# Patient Record
Sex: MALE | Race: WHITE | NOT HISPANIC OR LATINO
[De-identification: names, ages, dates, MRNs, and addresses within clinical notes are randomized per-mention and may not be internally consistent; named-entity substitution may affect disease eponyms.]

---

## 2017-03-07 ENCOUNTER — MEDICATION RENEWAL (OUTPATIENT)
Age: 72
End: 2017-03-07

## 2017-03-21 ENCOUNTER — APPOINTMENT (OUTPATIENT)
Dept: NEPHROLOGY | Facility: CLINIC | Age: 72
End: 2017-03-21

## 2017-03-21 VITALS
OXYGEN SATURATION: 99 % | HEART RATE: 62 BPM | DIASTOLIC BLOOD PRESSURE: 82 MMHG | SYSTOLIC BLOOD PRESSURE: 157 MMHG | WEIGHT: 208 LBS | HEIGHT: 70 IN | BODY MASS INDEX: 29.78 KG/M2

## 2017-03-21 VITALS — SYSTOLIC BLOOD PRESSURE: 150 MMHG | HEART RATE: 60 BPM | DIASTOLIC BLOOD PRESSURE: 80 MMHG

## 2017-03-21 DIAGNOSIS — M10.9 GOUT, UNSPECIFIED: ICD-10-CM

## 2017-03-22 ENCOUNTER — MEDICATION RENEWAL (OUTPATIENT)
Age: 72
End: 2017-03-22

## 2017-03-22 LAB
25(OH)D3 SERPL-MCNC: 31.7 NG/ML
ALBUMIN SERPL ELPH-MCNC: 4.3 G/DL
ALP BLD-CCNC: 86 U/L
ALT SERPL-CCNC: 13 U/L
ANION GAP SERPL CALC-SCNC: 15 MMOL/L
AST SERPL-CCNC: 14 U/L
BASOPHILS # BLD AUTO: 0.06 K/UL
BASOPHILS NFR BLD AUTO: 0.8 %
BILIRUB SERPL-MCNC: 0.3 MG/DL
BUN SERPL-MCNC: 37 MG/DL
CALCIUM SERPL-MCNC: 9.3 MG/DL
CALCIUM SERPL-MCNC: 9.3 MG/DL
CHLORIDE SERPL-SCNC: 105 MMOL/L
CHOLEST SERPL-MCNC: 147 MG/DL
CHOLEST/HDLC SERPL: 3.9 RATIO
CO2 SERPL-SCNC: 21 MMOL/L
CREAT SERPL-MCNC: 2.26 MG/DL
CREAT SPEC-SCNC: 215 MG/DL
CREAT/PROT UR: 0.7 RATIO
EOSINOPHIL # BLD AUTO: 0.38 K/UL
EOSINOPHIL NFR BLD AUTO: 5.2 %
FERRITIN SERPL-MCNC: 169.2 NG/ML
GLUCOSE SERPL-MCNC: 112 MG/DL
HBA1C MFR BLD HPLC: 6.2 %
HCT VFR BLD CALC: 38.1 %
HDLC SERPL-MCNC: 38 MG/DL
HGB BLD-MCNC: 12 G/DL
IMM GRANULOCYTES NFR BLD AUTO: 0.3 %
IRON SATN MFR SERPL: 18 %
IRON SERPL-MCNC: 44 UG/DL
LDLC SERPL CALC-MCNC: 78 MG/DL
LYMPHOCYTES # BLD AUTO: 1.25 K/UL
LYMPHOCYTES NFR BLD AUTO: 17.3 %
MAN DIFF?: NORMAL
MCHC RBC-ENTMCNC: 28.2 PG
MCHC RBC-ENTMCNC: 31.5 GM/DL
MCV RBC AUTO: 89.4 FL
MONOCYTES # BLD AUTO: 0.48 K/UL
MONOCYTES NFR BLD AUTO: 6.6 %
NEUTROPHILS # BLD AUTO: 5.05 K/UL
NEUTROPHILS NFR BLD AUTO: 69.8 %
PARATHYROID HORMONE INTACT: 57 PG/ML
PHOSPHATE SERPL-MCNC: 4.1 MG/DL
PLATELET # BLD AUTO: 260 K/UL
POTASSIUM SERPL-SCNC: 5.3 MMOL/L
PROT SERPL-MCNC: 7.1 G/DL
PROT UR-MCNC: 161 MG/DL
RBC # BLD: 4.26 M/UL
RBC # FLD: 14.7 %
SODIUM SERPL-SCNC: 141 MMOL/L
TIBC SERPL-MCNC: 245 UG/DL
TRIGL SERPL-MCNC: 153 MG/DL
UIBC SERPL-MCNC: 201 UG/DL
URATE SERPL-MCNC: 8.5 MG/DL
WBC # FLD AUTO: 7.24 K/UL

## 2017-05-05 ENCOUNTER — RX RENEWAL (OUTPATIENT)
Age: 72
End: 2017-05-05

## 2017-05-26 ENCOUNTER — OTHER (OUTPATIENT)
Age: 72
End: 2017-05-26

## 2017-05-30 LAB
ALBUMIN SERPL ELPH-MCNC: 4.1 G/DL
ALP BLD-CCNC: 90 U/L
ALT SERPL-CCNC: 16 U/L
ANION GAP SERPL CALC-SCNC: 18 MMOL/L
AST SERPL-CCNC: 13 U/L
BASOPHILS # BLD AUTO: 0.04 K/UL
BASOPHILS NFR BLD AUTO: 0.6 %
BILIRUB SERPL-MCNC: 0.4 MG/DL
BUN SERPL-MCNC: 40 MG/DL
CALCIUM SERPL-MCNC: 9.6 MG/DL
CHLORIDE SERPL-SCNC: 104 MMOL/L
CO2 SERPL-SCNC: 19 MMOL/L
CREAT SERPL-MCNC: 2.71 MG/DL
EOSINOPHIL # BLD AUTO: 0.21 K/UL
EOSINOPHIL NFR BLD AUTO: 2.9 %
GLUCOSE SERPL-MCNC: 115 MG/DL
HCT VFR BLD CALC: 36.5 %
HGB BLD-MCNC: 11.6 G/DL
IMM GRANULOCYTES NFR BLD AUTO: 0.1 %
LYMPHOCYTES # BLD AUTO: 0.77 K/UL
LYMPHOCYTES NFR BLD AUTO: 10.7 %
MAN DIFF?: NORMAL
MCHC RBC-ENTMCNC: 28.3 PG
MCHC RBC-ENTMCNC: 31.8 GM/DL
MCV RBC AUTO: 89 FL
MONOCYTES # BLD AUTO: 0.54 K/UL
MONOCYTES NFR BLD AUTO: 7.5 %
NEUTROPHILS # BLD AUTO: 5.61 K/UL
NEUTROPHILS NFR BLD AUTO: 78.2 %
PHOSPHATE SERPL-MCNC: 3.6 MG/DL
PLATELET # BLD AUTO: 221 K/UL
POTASSIUM SERPL-SCNC: 4.8 MMOL/L
PROT SERPL-MCNC: 7.6 G/DL
RBC # BLD: 4.1 M/UL
RBC # FLD: 15.1 %
SODIUM SERPL-SCNC: 141 MMOL/L
URATE SERPL-MCNC: 8.7 MG/DL
WBC # FLD AUTO: 7.18 K/UL

## 2017-06-21 ENCOUNTER — APPOINTMENT (OUTPATIENT)
Dept: NEPHROLOGY | Facility: CLINIC | Age: 72
End: 2017-06-21

## 2017-06-21 VITALS
WEIGHT: 208 LBS | DIASTOLIC BLOOD PRESSURE: 76 MMHG | HEIGHT: 70 IN | BODY MASS INDEX: 29.78 KG/M2 | HEART RATE: 56 BPM | OXYGEN SATURATION: 96 % | SYSTOLIC BLOOD PRESSURE: 152 MMHG

## 2017-07-11 ENCOUNTER — APPOINTMENT (OUTPATIENT)
Dept: MRI IMAGING | Facility: CLINIC | Age: 72
End: 2017-07-11

## 2017-07-19 ENCOUNTER — APPOINTMENT (OUTPATIENT)
Dept: MRI IMAGING | Facility: CLINIC | Age: 72
End: 2017-07-19

## 2017-07-19 ENCOUNTER — OUTPATIENT (OUTPATIENT)
Dept: OUTPATIENT SERVICES | Facility: HOSPITAL | Age: 72
LOS: 1 days | End: 2017-07-19
Payer: COMMERCIAL

## 2017-07-19 DIAGNOSIS — Z00.8 ENCOUNTER FOR OTHER GENERAL EXAMINATION: ICD-10-CM

## 2017-07-19 PROCEDURE — 72195 MRI PELVIS W/O DYE: CPT

## 2017-07-19 PROCEDURE — 74181 MRI ABDOMEN W/O CONTRAST: CPT

## 2017-11-28 ENCOUNTER — APPOINTMENT (OUTPATIENT)
Dept: NEPHROLOGY | Facility: CLINIC | Age: 72
End: 2017-11-28

## 2017-12-21 ENCOUNTER — APPOINTMENT (OUTPATIENT)
Dept: NEPHROLOGY | Facility: CLINIC | Age: 72
End: 2017-12-21
Payer: COMMERCIAL

## 2017-12-21 VITALS
BODY MASS INDEX: 29.67 KG/M2 | WEIGHT: 207.23 LBS | SYSTOLIC BLOOD PRESSURE: 147 MMHG | OXYGEN SATURATION: 97 % | HEART RATE: 63 BPM | DIASTOLIC BLOOD PRESSURE: 86 MMHG | HEIGHT: 70 IN

## 2017-12-21 PROCEDURE — 99214 OFFICE O/P EST MOD 30 MIN: CPT

## 2018-01-02 ENCOUNTER — RX RENEWAL (OUTPATIENT)
Age: 73
End: 2018-01-02

## 2018-01-03 ENCOUNTER — MEDICATION RENEWAL (OUTPATIENT)
Age: 73
End: 2018-01-03

## 2018-02-12 ENCOUNTER — APPOINTMENT (OUTPATIENT)
Dept: CV DIAGNOSITCS | Facility: HOSPITAL | Age: 73
End: 2018-02-12

## 2018-02-12 ENCOUNTER — OUTPATIENT (OUTPATIENT)
Dept: OUTPATIENT SERVICES | Facility: HOSPITAL | Age: 73
LOS: 1 days | End: 2018-02-12
Payer: COMMERCIAL

## 2018-02-12 DIAGNOSIS — I13.10 HYPERTENSIVE HEART AND CHRONIC KIDNEY DISEASE WITHOUT HEART FAILURE, WITH STAGE 1 THROUGH STAGE 4 CHRONIC KIDNEY DISEASE, OR UNSPECIFIED CHRONIC KIDNEY DISEASE: ICD-10-CM

## 2018-02-12 PROCEDURE — 93306 TTE W/DOPPLER COMPLETE: CPT | Mod: 26

## 2018-02-12 PROCEDURE — 93306 TTE W/DOPPLER COMPLETE: CPT

## 2018-05-07 ENCOUNTER — MEDICATION RENEWAL (OUTPATIENT)
Age: 73
End: 2018-05-07

## 2018-05-11 ENCOUNTER — APPOINTMENT (OUTPATIENT)
Dept: NEPHROLOGY | Facility: CLINIC | Age: 73
End: 2018-05-11
Payer: COMMERCIAL

## 2018-05-11 VITALS — HEART RATE: 70 BPM | DIASTOLIC BLOOD PRESSURE: 80 MMHG | SYSTOLIC BLOOD PRESSURE: 144 MMHG

## 2018-05-11 VITALS
OXYGEN SATURATION: 97 % | HEART RATE: 70 BPM | BODY MASS INDEX: 29.35 KG/M2 | SYSTOLIC BLOOD PRESSURE: 157 MMHG | WEIGHT: 205.03 LBS | DIASTOLIC BLOOD PRESSURE: 77 MMHG | HEIGHT: 70 IN

## 2018-05-11 LAB
25(OH)D3 SERPL-MCNC: 32.3 NG/ML
ALBUMIN SERPL ELPH-MCNC: 4.3 G/DL
ALP BLD-CCNC: 98 U/L
ALT SERPL-CCNC: 45 U/L
ANION GAP SERPL CALC-SCNC: 15 MMOL/L
AST SERPL-CCNC: 30 U/L
BASOPHILS # BLD AUTO: 0.04 K/UL
BASOPHILS NFR BLD AUTO: 0.6 %
BILIRUB SERPL-MCNC: 0.4 MG/DL
BUN SERPL-MCNC: 40 MG/DL
CALCIUM SERPL-MCNC: 9.9 MG/DL
CALCIUM SERPL-MCNC: 9.9 MG/DL
CHLORIDE SERPL-SCNC: 108 MMOL/L
CHOLEST SERPL-MCNC: 129 MG/DL
CHOLEST/HDLC SERPL: 3.4 RATIO
CO2 SERPL-SCNC: 19 MMOL/L
CREAT SERPL-MCNC: 2.43 MG/DL
CREAT SPEC-SCNC: 161 MG/DL
CREAT/PROT UR: 0.7 RATIO
EOSINOPHIL # BLD AUTO: 0.15 K/UL
EOSINOPHIL NFR BLD AUTO: 2.3 %
FERRITIN SERPL-MCNC: 147 NG/ML
GLUCOSE SERPL-MCNC: 113 MG/DL
HBA1C MFR BLD HPLC: 5.9 %
HCT VFR BLD CALC: 37.9 %
HDLC SERPL-MCNC: 38 MG/DL
HGB BLD-MCNC: 12.1 G/DL
IMM GRANULOCYTES NFR BLD AUTO: 0.3 %
IRON SATN MFR SERPL: 35 %
IRON SERPL-MCNC: 86 UG/DL
LDLC SERPL CALC-MCNC: 66 MG/DL
LYMPHOCYTES # BLD AUTO: 1.11 K/UL
LYMPHOCYTES NFR BLD AUTO: 16.9 %
MAN DIFF?: NORMAL
MCHC RBC-ENTMCNC: 29.2 PG
MCHC RBC-ENTMCNC: 31.9 GM/DL
MCV RBC AUTO: 91.3 FL
MONOCYTES # BLD AUTO: 0.58 K/UL
MONOCYTES NFR BLD AUTO: 8.8 %
NEUTROPHILS # BLD AUTO: 4.66 K/UL
NEUTROPHILS NFR BLD AUTO: 71.1 %
PARATHYROID HORMONE INTACT: 66 PG/ML
PHOSPHATE SERPL-MCNC: 3.7 MG/DL
PLATELET # BLD AUTO: 202 K/UL
POTASSIUM SERPL-SCNC: 5 MMOL/L
PROT SERPL-MCNC: 7.3 G/DL
PROT UR-MCNC: 112 MG/DL
RBC # BLD: 4.15 M/UL
RBC # FLD: 15.1 %
SODIUM SERPL-SCNC: 142 MMOL/L
TIBC SERPL-MCNC: 249 UG/DL
TRIGL SERPL-MCNC: 126 MG/DL
UIBC SERPL-MCNC: 163 UG/DL
URATE SERPL-MCNC: 6.7 MG/DL
WBC # FLD AUTO: 6.56 K/UL

## 2018-05-11 PROCEDURE — 99214 OFFICE O/P EST MOD 30 MIN: CPT

## 2018-06-15 ENCOUNTER — OUTPATIENT (OUTPATIENT)
Dept: OUTPATIENT SERVICES | Facility: HOSPITAL | Age: 73
LOS: 1 days | End: 2018-06-15
Payer: COMMERCIAL

## 2018-06-15 ENCOUNTER — APPOINTMENT (OUTPATIENT)
Dept: MRI IMAGING | Facility: CLINIC | Age: 73
End: 2018-06-15
Payer: COMMERCIAL

## 2018-06-15 DIAGNOSIS — D49.0 NEOPLASM OF UNSPECIFIED BEHAVIOR OF DIGESTIVE SYSTEM: ICD-10-CM

## 2018-06-15 PROCEDURE — 72195 MRI PELVIS W/O DYE: CPT

## 2018-06-15 PROCEDURE — 72195 MRI PELVIS W/O DYE: CPT | Mod: 26

## 2018-06-15 PROCEDURE — 74181 MRI ABDOMEN W/O CONTRAST: CPT | Mod: 26

## 2018-06-15 PROCEDURE — 74181 MRI ABDOMEN W/O CONTRAST: CPT

## 2018-09-06 ENCOUNTER — INBOUND DOCUMENT (OUTPATIENT)
Age: 73
End: 2018-09-06

## 2018-10-24 ENCOUNTER — APPOINTMENT (OUTPATIENT)
Dept: OPHTHALMOLOGY | Facility: CLINIC | Age: 73
End: 2018-10-24
Payer: COMMERCIAL

## 2018-10-24 DIAGNOSIS — H02.826 CYSTS OF LEFT EYE, UNSPECIFIED EYELID: ICD-10-CM

## 2018-10-24 PROCEDURE — 92004 COMPRE OPH EXAM NEW PT 1/>: CPT

## 2019-01-31 ENCOUNTER — RX RENEWAL (OUTPATIENT)
Age: 74
End: 2019-01-31

## 2019-02-07 ENCOUNTER — OTHER (OUTPATIENT)
Age: 74
End: 2019-02-07

## 2019-02-14 ENCOUNTER — APPOINTMENT (OUTPATIENT)
Dept: NEPHROLOGY | Facility: CLINIC | Age: 74
End: 2019-02-14
Payer: COMMERCIAL

## 2019-02-14 VITALS
DIASTOLIC BLOOD PRESSURE: 74 MMHG | BODY MASS INDEX: 29.04 KG/M2 | WEIGHT: 202.82 LBS | HEIGHT: 70 IN | OXYGEN SATURATION: 95 % | HEART RATE: 75 BPM | SYSTOLIC BLOOD PRESSURE: 154 MMHG

## 2019-02-14 VITALS — SYSTOLIC BLOOD PRESSURE: 144 MMHG | DIASTOLIC BLOOD PRESSURE: 60 MMHG | HEART RATE: 70 BPM

## 2019-02-14 LAB
25(OH)D3 SERPL-MCNC: 29.8 NG/ML
ALBUMIN SERPL ELPH-MCNC: 4.2 G/DL
ALP BLD-CCNC: 101 U/L
ALT SERPL-CCNC: 33 U/L
ANION GAP SERPL CALC-SCNC: 14 MMOL/L
APPEARANCE: CLEAR
AST SERPL-CCNC: 23 U/L
BACTERIA: NEGATIVE
BASOPHILS # BLD AUTO: 0.03 K/UL
BASOPHILS NFR BLD AUTO: 0.4 %
BILIRUB SERPL-MCNC: 0.3 MG/DL
BILIRUBIN URINE: NEGATIVE
BLOOD URINE: NEGATIVE
BUN SERPL-MCNC: 35 MG/DL
CALCIUM SERPL-MCNC: 9.3 MG/DL
CALCIUM SERPL-MCNC: 9.3 MG/DL
CHLORIDE SERPL-SCNC: 109 MMOL/L
CHOLEST SERPL-MCNC: 129 MG/DL
CHOLEST/HDLC SERPL: 3.3 RATIO
CO2 SERPL-SCNC: 18 MMOL/L
COLOR: YELLOW
CREAT SERPL-MCNC: 2.44 MG/DL
CREAT SPEC-SCNC: 91 MG/DL
EOSINOPHIL # BLD AUTO: 0.32 K/UL
EOSINOPHIL NFR BLD AUTO: 4.7 %
FERRITIN SERPL-MCNC: 144 NG/ML
GLUCOSE QUALITATIVE U: NEGATIVE MG/DL
GLUCOSE SERPL-MCNC: 121 MG/DL
HBA1C MFR BLD HPLC: 5.8 %
HCT VFR BLD CALC: 36.7 %
HDLC SERPL-MCNC: 39 MG/DL
HGB BLD-MCNC: 11.4 G/DL
HYALINE CASTS: 1 /LPF
IMM GRANULOCYTES NFR BLD AUTO: 0.1 %
IRON SATN MFR SERPL: 22 %
IRON SERPL-MCNC: 50 UG/DL
KETONES URINE: NEGATIVE
LDLC SERPL CALC-MCNC: 64 MG/DL
LEUKOCYTE ESTERASE URINE: NEGATIVE
LYMPHOCYTES # BLD AUTO: 1.39 K/UL
LYMPHOCYTES NFR BLD AUTO: 20.4 %
MAGNESIUM SERPL-MCNC: 2.3 MG/DL
MAN DIFF?: NORMAL
MCHC RBC-ENTMCNC: 28.9 PG
MCHC RBC-ENTMCNC: 31.1 GM/DL
MCV RBC AUTO: 93.1 FL
MICROALBUMIN 24H UR DL<=1MG/L-MCNC: 74.4 MG/DL
MICROALBUMIN/CREAT 24H UR-RTO: 814 MG/G
MICROSCOPIC-UA: NORMAL
MONOCYTES # BLD AUTO: 0.69 K/UL
MONOCYTES NFR BLD AUTO: 10.1 %
NEUTROPHILS # BLD AUTO: 4.39 K/UL
NEUTROPHILS NFR BLD AUTO: 64.3 %
NITRITE URINE: NEGATIVE
PARATHYROID HORMONE INTACT: 107 PG/ML
PH URINE: 5.5
PLATELET # BLD AUTO: 204 K/UL
POTASSIUM SERPL-SCNC: 4.9 MMOL/L
PROT SERPL-MCNC: 6.9 G/DL
PROTEIN URINE: 100 MG/DL
RBC # BLD: 3.94 M/UL
RBC # FLD: 14.5 %
RED BLOOD CELLS URINE: 1 /HPF
SODIUM SERPL-SCNC: 141 MMOL/L
SPECIFIC GRAVITY URINE: 1.02
SQUAMOUS EPITHELIAL CELLS: 0 /HPF
TIBC SERPL-MCNC: 230 UG/DL
TRIGL SERPL-MCNC: 132 MG/DL
UIBC SERPL-MCNC: 180 UG/DL
URATE SERPL-MCNC: 7.2 MG/DL
UROBILINOGEN URINE: NEGATIVE MG/DL
WBC # FLD AUTO: 6.83 K/UL
WHITE BLOOD CELLS URINE: 0 /HPF

## 2019-02-14 PROCEDURE — 99214 OFFICE O/P EST MOD 30 MIN: CPT

## 2019-02-14 NOTE — PHYSICAL EXAM
[General Appearance - Alert] : alert [General Appearance - In No Acute Distress] : in no acute distress [Sclera] : the sclera and conjunctiva were normal [Outer Ear] : the ears and nose were normal in appearance [Neck Appearance] : the appearance of the neck was normal [Auscultation Breath Sounds / Voice Sounds] : lungs were clear to auscultation bilaterally [Heart Rate And Rhythm] : heart rate was normal and rhythm regular [Heart Sounds] : normal S1 and S2 [Heart Sounds Gallop] : no gallops [Heart Sounds Pericardial Friction Rub] : no pericardial rub [Systolic grade ___/6] : A grade [unfilled]/6 systolic murmur was heard. [FreeTextEntry1] : 1+ edema [Bowel Sounds] : normal bowel sounds [Abdomen Soft] : soft [No CVA Tenderness] : no ~M costovertebral angle tenderness [Involuntary Movements] : no involuntary movements were seen [] : no rash [No Focal Deficits] : no focal deficits [Oriented To Time, Place, And Person] : oriented to person, place, and time [Affect] : the affect was normal [Mood] : the mood was normal

## 2019-02-14 NOTE — HISTORY OF PRESENT ILLNESS
[FreeTextEntry1] : 75 yo Hebrew male here for followup of CKD-4, hyperkalemia, proteinuria and hypertension.\par Denies any new issues\par He no longer has gout issues since stopping the diuretic\par

## 2019-02-14 NOTE — ASSESSMENT
[FreeTextEntry1] : 73 yo male with HTN, proteinuria and CKD4, hyperkalemia, gout\par CKD4 in setting of HTN: Stable renal function \par Hyperkalemia: Improved.  Keep off ACE for now\par HTN: improved control.  Continue hydralazine 50mg BID along with other meds\par He was advised Na restriction and lifestyle modification with weight loss\par Continue Eplerenone 25 mg daily for aldosterone antagonist as long as we can keep \par Holding loop diuretic and use as needed\par Edema likely secondary to amlodipine effect\par Gout : no recent flares\par Proteinuria: Stable despite being off ACE\par Anemia: will trend\par Follow up in 4 months\par Patient was advised to follow-up with Dr. Lisker

## 2019-04-03 ENCOUNTER — RX RENEWAL (OUTPATIENT)
Age: 74
End: 2019-04-03

## 2019-06-26 ENCOUNTER — RX RENEWAL (OUTPATIENT)
Age: 74
End: 2019-06-26

## 2019-07-11 ENCOUNTER — MEDICATION RENEWAL (OUTPATIENT)
Age: 74
End: 2019-07-11

## 2019-10-15 ENCOUNTER — OTHER (OUTPATIENT)
Age: 74
End: 2019-10-15

## 2019-11-11 ENCOUNTER — APPOINTMENT (OUTPATIENT)
Dept: CV DIAGNOSITCS | Facility: HOSPITAL | Age: 74
End: 2019-11-11

## 2019-11-11 ENCOUNTER — OUTPATIENT (OUTPATIENT)
Dept: OUTPATIENT SERVICES | Facility: HOSPITAL | Age: 74
LOS: 1 days | End: 2019-11-11
Payer: COMMERCIAL

## 2019-11-11 DIAGNOSIS — I10 ESSENTIAL (PRIMARY) HYPERTENSION: ICD-10-CM

## 2019-11-11 PROCEDURE — 93306 TTE W/DOPPLER COMPLETE: CPT

## 2019-11-11 PROCEDURE — 93306 TTE W/DOPPLER COMPLETE: CPT | Mod: 26

## 2019-11-18 LAB
25(OH)D3 SERPL-MCNC: 29.9 NG/ML
ALBUMIN SERPL ELPH-MCNC: 4.2 G/DL
ANION GAP SERPL CALC-SCNC: 12 MMOL/L
BASOPHILS # BLD AUTO: 0.06 K/UL
BASOPHILS NFR BLD AUTO: 0.9 %
BUN SERPL-MCNC: 38 MG/DL
CALCIUM SERPL-MCNC: 9.4 MG/DL
CALCIUM SERPL-MCNC: 9.4 MG/DL
CHLORIDE SERPL-SCNC: 106 MMOL/L
CHOLEST SERPL-MCNC: 119 MG/DL
CHOLEST/HDLC SERPL: 3.1 RATIO
CO2 SERPL-SCNC: 21 MMOL/L
CREAT SERPL-MCNC: 2.4 MG/DL
CREAT SPEC-SCNC: 158 MG/DL
EOSINOPHIL # BLD AUTO: 0.3 K/UL
EOSINOPHIL NFR BLD AUTO: 4.5 %
ESTIMATED AVERAGE GLUCOSE: 120 MG/DL
FERRITIN SERPL-MCNC: 159 NG/ML
GLUCOSE SERPL-MCNC: 112 MG/DL
HBA1C MFR BLD HPLC: 5.8 %
HCT VFR BLD CALC: 36.5 %
HDLC SERPL-MCNC: 38 MG/DL
HGB BLD-MCNC: 11.4 G/DL
IMM GRANULOCYTES NFR BLD AUTO: 0.3 %
IRON SATN MFR SERPL: 26 %
IRON SERPL-MCNC: 70 UG/DL
LDLC SERPL CALC-MCNC: 60 MG/DL
LYMPHOCYTES # BLD AUTO: 1.19 K/UL
LYMPHOCYTES NFR BLD AUTO: 17.8 %
MAGNESIUM SERPL-MCNC: 2.2 MG/DL
MAN DIFF?: NORMAL
MCHC RBC-ENTMCNC: 29.5 PG
MCHC RBC-ENTMCNC: 31.2 GM/DL
MCV RBC AUTO: 94.3 FL
MICROALBUMIN 24H UR DL<=1MG/L-MCNC: 87.4 MG/DL
MICROALBUMIN/CREAT 24H UR-RTO: 555 MG/G
MONOCYTES # BLD AUTO: 0.72 K/UL
MONOCYTES NFR BLD AUTO: 10.7 %
NEUTROPHILS # BLD AUTO: 4.41 K/UL
NEUTROPHILS NFR BLD AUTO: 65.8 %
PARATHYROID HORMONE INTACT: 73 PG/ML
PHOSPHATE SERPL-MCNC: 3.5 MG/DL
PLATELET # BLD AUTO: 199 K/UL
POTASSIUM SERPL-SCNC: 5.1 MMOL/L
RBC # BLD: 3.87 M/UL
RBC # FLD: 14.3 %
SODIUM SERPL-SCNC: 139 MMOL/L
TIBC SERPL-MCNC: 269 UG/DL
TRIGL SERPL-MCNC: 105 MG/DL
UIBC SERPL-MCNC: 199 UG/DL
URATE SERPL-MCNC: 6.8 MG/DL
WBC # FLD AUTO: 6.7 K/UL

## 2019-11-27 ENCOUNTER — OUTPATIENT (OUTPATIENT)
Dept: OUTPATIENT SERVICES | Facility: HOSPITAL | Age: 74
LOS: 1 days | End: 2019-11-27
Payer: COMMERCIAL

## 2019-11-27 ENCOUNTER — APPOINTMENT (OUTPATIENT)
Dept: MRI IMAGING | Facility: CLINIC | Age: 74
End: 2019-11-27
Payer: COMMERCIAL

## 2019-11-27 DIAGNOSIS — Z00.8 ENCOUNTER FOR OTHER GENERAL EXAMINATION: ICD-10-CM

## 2019-11-27 PROCEDURE — 72195 MRI PELVIS W/O DYE: CPT | Mod: 26

## 2019-11-27 PROCEDURE — 74181 MRI ABDOMEN W/O CONTRAST: CPT | Mod: 26

## 2019-11-27 PROCEDURE — 74181 MRI ABDOMEN W/O CONTRAST: CPT

## 2019-11-27 PROCEDURE — 72195 MRI PELVIS W/O DYE: CPT

## 2019-12-05 ENCOUNTER — APPOINTMENT (OUTPATIENT)
Dept: NEPHROLOGY | Facility: CLINIC | Age: 74
End: 2019-12-05
Payer: COMMERCIAL

## 2019-12-05 VITALS
BODY MASS INDEX: 28.7 KG/M2 | WEIGHT: 200 LBS | OXYGEN SATURATION: 96 % | SYSTOLIC BLOOD PRESSURE: 134 MMHG | HEART RATE: 72 BPM | DIASTOLIC BLOOD PRESSURE: 62 MMHG

## 2019-12-05 PROCEDURE — 99214 OFFICE O/P EST MOD 30 MIN: CPT

## 2019-12-05 NOTE — ASSESSMENT
[FreeTextEntry1] : 75 yo male with HTN, proteinuria and CKD4, hyperkalemia, gout\par CKD4 in setting of HTN: Stable renal function \par Hyperkalemia: Improved.  Keep off ACE for now\par HTN: improved control.  Continue hydralazine 50mg BID along with other meds\par He was advised Na restriction and lifestyle modification \par Continue Eplerenone 25 mg daily for aldosterone antagonist as long as we can keep \par Holding loop diuretic and use as needed\par Edema likely secondary to amlodipine effect\par Gout : no recent flares\par Proteinuria: Stable despite being off ACE\par Anemia: trend\par Pt requesting a onc second opinion within [facility]\par Follow up in 4 months

## 2019-12-05 NOTE — PHYSICAL EXAM
[General Appearance - Alert] : alert [General Appearance - In No Acute Distress] : in no acute distress [Sclera] : the sclera and conjunctiva were normal [Outer Ear] : the ears and nose were normal in appearance [Neck Appearance] : the appearance of the neck was normal [Auscultation Breath Sounds / Voice Sounds] : lungs were clear to auscultation bilaterally [Heart Rate And Rhythm] : heart rate was normal and rhythm regular [Heart Sounds] : normal S1 and S2 [Heart Sounds Gallop] : no gallops [Heart Sounds Pericardial Friction Rub] : no pericardial rub [Systolic grade ___/6] : A grade [unfilled]/6 systolic murmur was heard. [FreeTextEntry1] : 1-+ edema [Bowel Sounds] : normal bowel sounds [Abdomen Soft] : soft [No CVA Tenderness] : no ~M costovertebral angle tenderness [Involuntary Movements] : no involuntary movements were seen [] : no rash [No Focal Deficits] : no focal deficits [Oriented To Time, Place, And Person] : oriented to person, place, and time [Affect] : the affect was normal [Mood] : the mood was normal

## 2019-12-05 NOTE — HISTORY OF PRESENT ILLNESS
[FreeTextEntry1] : 75 yo Faro[place] male here for followup of CKD-4, hyperkalemia, proteinuria and hypertension.\par He remains off the lasix\par No new gout\par He has a mesenteric mass and follows with MD in [place] and was referred to onc bc of new hepatic lesions from his MRI abd\par Unclear the diagnosis\par Also has a new PMD in [place] and had echo done for followup show no new changes

## 2019-12-27 ENCOUNTER — OUTPATIENT (OUTPATIENT)
Dept: OUTPATIENT SERVICES | Facility: HOSPITAL | Age: 74
LOS: 1 days | Discharge: HOME | End: 2019-12-27
Payer: COMMERCIAL

## 2019-12-27 DIAGNOSIS — K76.9 LIVER DISEASE, UNSPECIFIED: ICD-10-CM

## 2019-12-30 ENCOUNTER — RESULT REVIEW (OUTPATIENT)
Age: 74
End: 2019-12-30

## 2019-12-30 ENCOUNTER — APPOINTMENT (OUTPATIENT)
Age: 74
End: 2019-12-30
Payer: COMMERCIAL

## 2019-12-30 VITALS
HEART RATE: 70 BPM | TEMPERATURE: 97.9 F | DIASTOLIC BLOOD PRESSURE: 78 MMHG | SYSTOLIC BLOOD PRESSURE: 172 MMHG | HEIGHT: 69.49 IN | WEIGHT: 195.11 LBS | OXYGEN SATURATION: 95 % | BODY MASS INDEX: 28.25 KG/M2 | RESPIRATION RATE: 18 BRPM

## 2019-12-30 DIAGNOSIS — Z87.891 PERSONAL HISTORY OF NICOTINE DEPENDENCE: ICD-10-CM

## 2019-12-30 LAB
BASOPHILS # BLD AUTO: 0 K/UL — SIGNIFICANT CHANGE UP (ref 0–0.2)
BASOPHILS NFR BLD AUTO: 0 % — SIGNIFICANT CHANGE UP (ref 0–2)
EOSINOPHIL # BLD AUTO: 0.2 K/UL — SIGNIFICANT CHANGE UP (ref 0–0.5)
EOSINOPHIL NFR BLD AUTO: 2.9 % — SIGNIFICANT CHANGE UP (ref 0–6)
HCT VFR BLD CALC: 34.5 % — LOW (ref 39–50)
HGB BLD-MCNC: 11.5 G/DL — LOW (ref 13–17)
LYMPHOCYTES # BLD AUTO: 1.2 K/UL — SIGNIFICANT CHANGE UP (ref 1–3.3)
LYMPHOCYTES # BLD AUTO: 15.7 % — SIGNIFICANT CHANGE UP (ref 13–44)
MCHC RBC-ENTMCNC: 30.4 PG — SIGNIFICANT CHANGE UP (ref 27–34)
MCHC RBC-ENTMCNC: 33.3 G/DL — SIGNIFICANT CHANGE UP (ref 32–36)
MCV RBC AUTO: 91.5 FL — SIGNIFICANT CHANGE UP (ref 80–100)
MONOCYTES # BLD AUTO: 0.6 K/UL — SIGNIFICANT CHANGE UP (ref 0–0.9)
MONOCYTES NFR BLD AUTO: 7.8 % — SIGNIFICANT CHANGE UP (ref 2–14)
NEUTROPHILS # BLD AUTO: 5.6 K/UL — SIGNIFICANT CHANGE UP (ref 1.8–7.4)
NEUTROPHILS NFR BLD AUTO: 73.5 % — SIGNIFICANT CHANGE UP (ref 43–77)
PLATELET # BLD AUTO: 199 K/UL — SIGNIFICANT CHANGE UP (ref 150–400)
RBC # BLD: 3.78 M/UL — LOW (ref 4.2–5.8)
RBC # FLD: 12.9 % — SIGNIFICANT CHANGE UP (ref 10.3–14.5)
WBC # BLD: 7.6 K/UL — SIGNIFICANT CHANGE UP (ref 3.8–10.5)
WBC # FLD AUTO: 7.6 K/UL — SIGNIFICANT CHANGE UP (ref 3.8–10.5)

## 2019-12-30 PROCEDURE — 99205 OFFICE O/P NEW HI 60 MIN: CPT

## 2019-12-30 RX ORDER — SODIUM BICARBONATE 650 MG/1
650 TABLET ORAL
Qty: 90 | Refills: 3 | Status: DISCONTINUED | COMMUNITY
Start: 2019-02-14 | End: 2019-12-30

## 2019-12-30 NOTE — HISTORY OF PRESENT ILLNESS
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 Pt initially had Q 6 mos MRI a/p, then annual scans. Previous scan in June 2018 showed stable mesenteric mass and then patient delayed his follow up scan till Nov 2019. MRI Abd/Pelvis showed new bilobar liver lesions suspicious for hepatic mets, two new lesions at T12 and L3 concerning for bone mets. Patient lives in [place] and saw Dr. Saenz, medical oncology, who recommended a liver biopsy but wanted a second opinion. \par \par \par \par  [de-identified] : well differentiated NET  [de-identified] : Here for initial visit.\par \par Episodes of early satiety fullness since surgery, does not take PPI or H2 blocker. \par lately eating less ?over the last 6 months- lost of 15 lbs in the last month\par bilateral lower back pain also for years since  \par episodic b/l LE edema which is better in the am

## 2019-12-30 NOTE — CONSULT LETTER
[Dear  ___] : Dear  [unfilled], [Courtesy Letter:] : I had the pleasure of seeing your patient, [unfilled], in my office today. [Please see my note below.] : Please see my note below. [Sincerely,] : Sincerely, [Consult Closing:] : Thank you very much for allowing me to participate in the care of this patient.  If you have any questions, please do not hesitate to contact me. [FreeTextEntry3] : Jen Mcgovern D.O. \par Attending Physician \par [facility] On[facility] H[facility]\par  \par [facility] \par Tel: [phone]\par Fax: [phone]\par

## 2019-12-31 ENCOUNTER — RESULT REVIEW (OUTPATIENT)
Age: 74
End: 2019-12-31

## 2019-12-31 ENCOUNTER — APPOINTMENT (OUTPATIENT)
Age: 74
End: 2019-12-31

## 2019-12-31 LAB
ALBUMIN SERPL ELPH-MCNC: 4.1 G/DL
ALP BLD-CCNC: 114 U/L
ALT SERPL-CCNC: 26 U/L
ANION GAP SERPL CALC-SCNC: 12 MMOL/L
APTT BLD: 30.1 SEC
AST SERPL-CCNC: 22 U/L
BILIRUB SERPL-MCNC: 0.3 MG/DL
BUN SERPL-MCNC: 31 MG/DL
BUN SERPL-MCNC: 31 MG/DL — HIGH (ref 7–23)
CA-I BLDA-SCNC: 1.25 MMOL/L — SIGNIFICANT CHANGE UP (ref 1.12–1.3)
CALCIUM SERPL-MCNC: 9.4 MG/DL
CHLORIDE SERPL-SCNC: 109 MMOL/L
CHLORIDE SERPL-SCNC: 111 MMOL/L — HIGH (ref 96–108)
CO2 SERPL-SCNC: 19 MMOL/L
CO2 SERPL-SCNC: 22 MMOL/L — SIGNIFICANT CHANGE UP (ref 22–31)
CREAT SERPL-MCNC: 2.35 MG/DL
CREAT SERPL-MCNC: 2.6 MG/DL — HIGH (ref 0.5–1.3)
GLUCOSE SERPL-MCNC: 103 MG/DL
GLUCOSE SERPL-MCNC: 111 MG/DL — HIGH (ref 70–99)
INR PPP: 0.93 RATIO
POTASSIUM SERPL-MCNC: 5.3 MMOL/L — SIGNIFICANT CHANGE UP (ref 3.5–5.3)
POTASSIUM SERPL-SCNC: 5.3 MMOL/L — SIGNIFICANT CHANGE UP (ref 3.5–5.3)
POTASSIUM SERPL-SCNC: 6 MMOL/L
PROT SERPL-MCNC: 6.9 G/DL
PT BLD: 10.5 SEC
SODIUM SERPL-SCNC: 140 MMOL/L
SODIUM SERPL-SCNC: 141 MMOL/L — SIGNIFICANT CHANGE UP (ref 135–145)

## 2019-12-31 RX ORDER — COLCHICINE 0.6 MG/1
0.6 TABLET, FILM COATED ORAL DAILY
Qty: 30 | Refills: 4 | Status: DISCONTINUED | COMMUNITY
Start: 2017-03-22 | End: 2019-12-31

## 2020-01-02 LAB — CGA SERPL-MCNC: 3107 NG/ML

## 2020-01-03 ENCOUNTER — RESULT REVIEW (OUTPATIENT)
Age: 75
End: 2020-01-03

## 2020-01-03 PROCEDURE — 88321 CONSLTJ&REPRT SLD PREP ELSWR: CPT

## 2020-01-06 LAB — SURGICAL PATHOLOGY STUDY: SIGNIFICANT CHANGE UP

## 2020-01-14 ENCOUNTER — FORM ENCOUNTER (OUTPATIENT)
Age: 75
End: 2020-01-14

## 2020-01-15 ENCOUNTER — OUTPATIENT (OUTPATIENT)
Dept: OUTPATIENT SERVICES | Facility: HOSPITAL | Age: 75
LOS: 1 days | End: 2020-01-15
Payer: COMMERCIAL

## 2020-01-15 ENCOUNTER — APPOINTMENT (OUTPATIENT)
Dept: NUCLEAR MEDICINE | Facility: IMAGING CENTER | Age: 75
End: 2020-01-15
Payer: COMMERCIAL

## 2020-01-15 DIAGNOSIS — D3A.8 OTHER BENIGN NEUROENDOCRINE TUMORS: ICD-10-CM

## 2020-01-15 PROCEDURE — A9587: CPT

## 2020-01-15 PROCEDURE — 78815 PET IMAGE W/CT SKULL-THIGH: CPT

## 2020-01-15 PROCEDURE — 78815 PET IMAGE W/CT SKULL-THIGH: CPT | Mod: 26,PI

## 2020-01-16 ENCOUNTER — FORM ENCOUNTER (OUTPATIENT)
Age: 75
End: 2020-01-16

## 2020-01-17 ENCOUNTER — RESULT REVIEW (OUTPATIENT)
Age: 75
End: 2020-01-17

## 2020-01-17 ENCOUNTER — APPOINTMENT (OUTPATIENT)
Dept: ULTRASOUND IMAGING | Facility: HOSPITAL | Age: 75
End: 2020-01-17
Payer: COMMERCIAL

## 2020-01-17 ENCOUNTER — OUTPATIENT (OUTPATIENT)
Dept: OUTPATIENT SERVICES | Facility: HOSPITAL | Age: 75
LOS: 1 days | End: 2020-01-17
Payer: COMMERCIAL

## 2020-01-17 DIAGNOSIS — Z00.00 ENCOUNTER FOR GENERAL ADULT MEDICAL EXAMINATION WITHOUT ABNORMAL FINDINGS: ICD-10-CM

## 2020-01-17 DIAGNOSIS — D3A.8 OTHER BENIGN NEUROENDOCRINE TUMORS: ICD-10-CM

## 2020-01-17 PROCEDURE — 88342 IMHCHEM/IMCYTCHM 1ST ANTB: CPT

## 2020-01-17 PROCEDURE — 76942 ECHO GUIDE FOR BIOPSY: CPT | Mod: 26

## 2020-01-17 PROCEDURE — 76942 ECHO GUIDE FOR BIOPSY: CPT

## 2020-01-17 PROCEDURE — 88360 TUMOR IMMUNOHISTOCHEM/MANUAL: CPT | Mod: 26

## 2020-01-17 PROCEDURE — 88360 TUMOR IMMUNOHISTOCHEM/MANUAL: CPT

## 2020-01-17 PROCEDURE — 88342 IMHCHEM/IMCYTCHM 1ST ANTB: CPT | Mod: 26,59

## 2020-01-17 PROCEDURE — 88341 IMHCHEM/IMCYTCHM EA ADD ANTB: CPT

## 2020-01-17 PROCEDURE — 88341 IMHCHEM/IMCYTCHM EA ADD ANTB: CPT | Mod: 26,59

## 2020-01-17 PROCEDURE — 88307 TISSUE EXAM BY PATHOLOGIST: CPT

## 2020-01-17 PROCEDURE — 47000 NEEDLE BIOPSY OF LIVER PERQ: CPT

## 2020-01-17 PROCEDURE — 88307 TISSUE EXAM BY PATHOLOGIST: CPT | Mod: 26

## 2020-01-22 LAB — SURGICAL PATHOLOGY STUDY: SIGNIFICANT CHANGE UP

## 2020-01-25 ENCOUNTER — OUTPATIENT (OUTPATIENT)
Dept: OUTPATIENT SERVICES | Facility: HOSPITAL | Age: 75
LOS: 1 days | Discharge: HOME | End: 2020-01-25

## 2020-01-25 DIAGNOSIS — K76.9 LIVER DISEASE, UNSPECIFIED: ICD-10-CM

## 2020-01-31 ENCOUNTER — APPOINTMENT (OUTPATIENT)
Dept: HEMATOLOGY ONCOLOGY | Facility: CLINIC | Age: 75
End: 2020-01-31
Payer: COMMERCIAL

## 2020-01-31 VITALS
SYSTOLIC BLOOD PRESSURE: 143 MMHG | BODY MASS INDEX: 28.54 KG/M2 | DIASTOLIC BLOOD PRESSURE: 74 MMHG | HEART RATE: 69 BPM | OXYGEN SATURATION: 96 % | RESPIRATION RATE: 14 BRPM | WEIGHT: 195.99 LBS | TEMPERATURE: 98.3 F

## 2020-01-31 PROCEDURE — 99214 OFFICE O/P EST MOD 30 MIN: CPT

## 2020-02-05 NOTE — HISTORY OF PRESENT ILLNESS
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 Pt initially had Q 6 mos MRI a/p, then annual scans. Previous scan in June 2018 showed stable mesenteric mass and then patient delayed his follow up scan till Nov 2019. MRI Abd/Pelvis showed new bilobar liver lesions suspicious for hepatic mets, two new lesions at T12 and L3 concerning for bone mets. Patient lives in [place] and saw Dr. Saenz, medical oncology, who recommended a liver biopsy but wanted a second opinion. 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, mesenteric and R inguinal LN, multiple anterior abdominal wall mets, \par 1/17/20: Liver bx: well diff NET, Ki 30-70% (upon re-review)\par \par \par \par \par  [de-identified] : well differentiated NET, Ki67 30-70% [de-identified] : 2012 Mesenteric mass bx: well differentiated NET, Ki <3%, mitotic rate low [de-identified] : Presents to review PET and biopsy results. Discussed all available results and implications. \par He continues to feel well. QOL is the most important thing to him. \par At times feels tired but attributes this to old age. Occasionally has epigastric discomfort. This is been going on for many years.

## 2020-02-05 NOTE — CONSULT LETTER
[Dear  ___] : Dear  [unfilled], [Courtesy Letter:] : I had the pleasure of seeing your patient, [unfilled], in my office today. [Please see my note below.] : Please see my note below. [Consult Closing:] : Thank you very much for allowing me to participate in the care of this patient.  If you have any questions, please do not hesitate to contact me. [Sincerely,] : Sincerely, [FreeTextEntry3] : Jen Mcgovern D.O. \par Attending Physician \par [facility] H[facility]\par  \par [facility] \par Tel: [phone]\par Fax: [phone]\par

## 2020-02-05 NOTE — REASON FOR VISIT
[Follow-Up Visit] : a follow-up [Spouse] : spouse [FreeTextEntry2] : Stage IV well differentiated tumor, discordant grade

## 2020-02-14 ENCOUNTER — APPOINTMENT (OUTPATIENT)
Age: 75
End: 2020-02-14

## 2020-02-18 DIAGNOSIS — E87.5 HYPERKALEMIA: ICD-10-CM

## 2020-02-18 DIAGNOSIS — D3A.8 OTHER BENIGN NEUROENDOCRINE TUMORS: ICD-10-CM

## 2020-02-29 ENCOUNTER — OUTPATIENT (OUTPATIENT)
Dept: OUTPATIENT SERVICES | Facility: HOSPITAL | Age: 75
LOS: 1 days | Discharge: HOME | End: 2020-02-29

## 2020-02-29 DIAGNOSIS — K76.9 LIVER DISEASE, UNSPECIFIED: ICD-10-CM

## 2020-02-29 PROBLEM — Z86.39 PERSONAL HISTORY OF OTHER ENDOCRINE, NUTRITIONAL AND METABOLIC DISEASE: Chronic | Status: ACTIVE | Noted: 2020-02-14

## 2020-02-29 PROBLEM — R80.9 PROTEINURIA, UNSPECIFIED: Chronic | Status: ACTIVE | Noted: 2020-02-14

## 2020-02-29 PROBLEM — H26.9 UNSPECIFIED CATARACT: Chronic | Status: ACTIVE | Noted: 2020-02-14

## 2020-02-29 PROBLEM — N40.0 BENIGN PROSTATIC HYPERPLASIA WITHOUT LOWER URINARY TRACT SYMPTOMS: Chronic | Status: ACTIVE | Noted: 2020-02-14

## 2020-02-29 PROBLEM — N18.9 CHRONIC KIDNEY DISEASE, UNSPECIFIED: Chronic | Status: ACTIVE | Noted: 2020-02-14

## 2020-03-06 ENCOUNTER — APPOINTMENT (OUTPATIENT)
Age: 75
End: 2020-03-06

## 2020-03-12 ENCOUNTER — APPOINTMENT (OUTPATIENT)
Age: 75
End: 2020-03-12

## 2020-03-13 DIAGNOSIS — E87.5 HYPERKALEMIA: ICD-10-CM

## 2020-03-13 DIAGNOSIS — D3A.8 OTHER BENIGN NEUROENDOCRINE TUMORS: ICD-10-CM

## 2020-03-30 ENCOUNTER — APPOINTMENT (OUTPATIENT)
Dept: CT IMAGING | Facility: CLINIC | Age: 75
End: 2020-03-30

## 2020-03-30 ENCOUNTER — APPOINTMENT (OUTPATIENT)
Dept: MRI IMAGING | Facility: CLINIC | Age: 75
End: 2020-03-30

## 2020-04-06 ENCOUNTER — OUTPATIENT (OUTPATIENT)
Dept: OUTPATIENT SERVICES | Facility: HOSPITAL | Age: 75
LOS: 1 days | Discharge: HOME | End: 2020-04-06

## 2020-04-06 DIAGNOSIS — K76.9 LIVER DISEASE, UNSPECIFIED: ICD-10-CM

## 2020-04-10 ENCOUNTER — APPOINTMENT (OUTPATIENT)
Age: 75
End: 2020-04-10

## 2020-04-26 ENCOUNTER — MESSAGE (OUTPATIENT)
Age: 75
End: 2020-04-26

## 2020-05-01 ENCOUNTER — APPOINTMENT (OUTPATIENT)
Age: 75
End: 2020-05-01
Payer: COMMERCIAL

## 2020-05-01 PROCEDURE — 99213 OFFICE O/P EST LOW 20 MIN: CPT | Mod: 95

## 2020-05-05 ENCOUNTER — OUTPATIENT (OUTPATIENT)
Dept: OUTPATIENT SERVICES | Facility: HOSPITAL | Age: 75
LOS: 1 days | Discharge: HOME | End: 2020-05-05

## 2020-05-05 DIAGNOSIS — K76.9 LIVER DISEASE, UNSPECIFIED: ICD-10-CM

## 2020-05-06 ENCOUNTER — APPOINTMENT (OUTPATIENT)
Age: 75
End: 2020-05-06

## 2020-05-07 DIAGNOSIS — C7A.8 OTHER MALIGNANT NEUROENDOCRINE TUMORS: ICD-10-CM

## 2020-05-16 NOTE — HISTORY OF PRESENT ILLNESS
[Disease: _____________________] : Disease: [unfilled] [AJCC Stage: ____] : AJCC Stage: [unfilled] [Therapy: ___] : Therapy: [unfilled] [Home] : at home, [unfilled] , at the time of the visit. 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 Pt initially had Q 6 mos MRI a/p, then annual scans. Previous scan in June 2018 showed stable mesenteric mass and then patient delayed his follow up scan till Nov 2019. MRI Abd/Pelvis showed new bilobar liver lesions suspicious for hepatic mets, two new lesions at T12 and L3 concerning for bone mets. Patient lives in [place] and saw Dr. Saenz, medical oncology, who recommended a liver biopsy but wanted a second opinion. 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, mesenteric and R inguinal LN, multiple anterior abdominal wall mets, \par 1/17/20: Liver bx: well diff NET, Ki 30-70% (upon re-review)\par 2/14/20: Lanreotide monthly started \par \par \par \par  [de-identified] : 2012 Mesenteric mass bx: well differentiated NET, Ki <3%, mitotic rate low [de-identified] : well differentiated NET, Ki67 30-70% [de-identified] : Overall feels well. Denies any c/o

## 2020-06-04 ENCOUNTER — OUTPATIENT (OUTPATIENT)
Dept: OUTPATIENT SERVICES | Facility: HOSPITAL | Age: 75
LOS: 1 days | Discharge: HOME | End: 2020-06-04

## 2020-06-04 ENCOUNTER — APPOINTMENT (OUTPATIENT)
Dept: MRI IMAGING | Facility: IMAGING CENTER | Age: 75
End: 2020-06-04
Payer: COMMERCIAL

## 2020-06-04 ENCOUNTER — RESULT REVIEW (OUTPATIENT)
Age: 75
End: 2020-06-04

## 2020-06-04 ENCOUNTER — OUTPATIENT (OUTPATIENT)
Dept: OUTPATIENT SERVICES | Facility: HOSPITAL | Age: 75
LOS: 1 days | End: 2020-06-04
Payer: COMMERCIAL

## 2020-06-04 ENCOUNTER — APPOINTMENT (OUTPATIENT)
Dept: CT IMAGING | Facility: IMAGING CENTER | Age: 75
End: 2020-06-04
Payer: COMMERCIAL

## 2020-06-04 DIAGNOSIS — D3A.8 OTHER BENIGN NEUROENDOCRINE TUMORS: ICD-10-CM

## 2020-06-04 DIAGNOSIS — K76.9 LIVER DISEASE, UNSPECIFIED: ICD-10-CM

## 2020-06-04 PROCEDURE — 74183 MRI ABD W/O CNTR FLWD CNTR: CPT | Mod: 26

## 2020-06-04 PROCEDURE — 72197 MRI PELVIS W/O & W/DYE: CPT | Mod: 26

## 2020-06-04 PROCEDURE — 71250 CT THORAX DX C-: CPT | Mod: 26

## 2020-06-04 PROCEDURE — A9585: CPT

## 2020-06-04 PROCEDURE — 74183 MRI ABD W/O CNTR FLWD CNTR: CPT

## 2020-06-04 PROCEDURE — 71250 CT THORAX DX C-: CPT

## 2020-06-04 PROCEDURE — 72197 MRI PELVIS W/O & W/DYE: CPT

## 2020-06-08 ENCOUNTER — APPOINTMENT (OUTPATIENT)
Age: 75
End: 2020-06-08
Payer: COMMERCIAL

## 2020-06-08 PROCEDURE — 99214 OFFICE O/P EST MOD 30 MIN: CPT | Mod: 95

## 2020-06-08 NOTE — HISTORY OF PRESENT ILLNESS
[Home] : at home, [unfilled] , at the time of the visit. 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 Pt initially had Q 6 mos MRI a/p, then annual scans. Previous scan in June 2018 showed stable mesenteric mass and then patient delayed his follow up scan till Nov 2019. MRI Abd/Pelvis showed new bilobar liver lesions suspicious for hepatic mets, two new lesions at T12 and L3 concerning for bone mets. Patient lives in [place] and saw Dr. Saenz, medical oncology, who recommended a liver biopsy but wanted a second opinion. 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, mesenteric and R inguinal LN, multiple anterior abdominal wall mets, \par 1/17/20: Liver bx: well diff NET, Ki 30-70% (upon re-review)\par 2/14/20: Lanreotide monthly started \par 5/15/20: CT Chest: clear lungs\par 6/5/20: MRI A/P: Interval increase in size of bilobar hepatic lesions \par \par  [de-identified] : 2012 Mesenteric mass bx: well differentiated NET, Ki <3%, mitotic rate low [de-identified] : Overall feels ok. Has been working a lot lately. Also keeping busy around the house. Takes occasional naps. Appetite is normal, weight is stable. Very active. \par Denies any pain.

## 2020-06-11 ENCOUNTER — RESULT REVIEW (OUTPATIENT)
Age: 75
End: 2020-06-11

## 2020-06-11 ENCOUNTER — OUTPATIENT (OUTPATIENT)
Dept: OUTPATIENT SERVICES | Facility: HOSPITAL | Age: 75
LOS: 1 days | End: 2020-06-11
Payer: COMMERCIAL

## 2020-06-11 ENCOUNTER — APPOINTMENT (OUTPATIENT)
Dept: NUCLEAR MEDICINE | Facility: IMAGING CENTER | Age: 75
End: 2020-06-11
Payer: COMMERCIAL

## 2020-06-11 ENCOUNTER — APPOINTMENT (OUTPATIENT)
Age: 75
End: 2020-06-11

## 2020-06-11 DIAGNOSIS — D3A.8 OTHER BENIGN NEUROENDOCRINE TUMORS: ICD-10-CM

## 2020-06-11 LAB
APTT BLD: 29.5 SEC
BASOPHILS # BLD AUTO: 0.03 K/UL — SIGNIFICANT CHANGE UP (ref 0–0.2)
BASOPHILS NFR BLD AUTO: 0.5 % — SIGNIFICANT CHANGE UP (ref 0–2)
EOSINOPHIL # BLD AUTO: 0.21 K/UL — SIGNIFICANT CHANGE UP (ref 0–0.5)
EOSINOPHIL NFR BLD AUTO: 3.6 % — SIGNIFICANT CHANGE UP (ref 0–6)
HCT VFR BLD CALC: 33.5 % — LOW (ref 39–50)
HGB BLD-MCNC: 11.1 G/DL — LOW (ref 13–17)
IMM GRANULOCYTES NFR BLD AUTO: 0.3 % — SIGNIFICANT CHANGE UP (ref 0–1.5)
INR PPP: 0.93 RATIO
LYMPHOCYTES # BLD AUTO: 0.92 K/UL — LOW (ref 1–3.3)
LYMPHOCYTES # BLD AUTO: 15.9 % — SIGNIFICANT CHANGE UP (ref 13–44)
MCHC RBC-ENTMCNC: 29.8 PG — SIGNIFICANT CHANGE UP (ref 27–34)
MCHC RBC-ENTMCNC: 33.1 GM/DL — SIGNIFICANT CHANGE UP (ref 32–36)
MCV RBC AUTO: 90.1 FL — SIGNIFICANT CHANGE UP (ref 80–100)
MONOCYTES # BLD AUTO: 0.59 K/UL — SIGNIFICANT CHANGE UP (ref 0–0.9)
MONOCYTES NFR BLD AUTO: 10.2 % — SIGNIFICANT CHANGE UP (ref 2–14)
NEUTROPHILS # BLD AUTO: 4 K/UL — SIGNIFICANT CHANGE UP (ref 1.8–7.4)
NEUTROPHILS NFR BLD AUTO: 69.5 % — SIGNIFICANT CHANGE UP (ref 43–77)
NRBC # BLD: 0 /100 WBCS — SIGNIFICANT CHANGE UP (ref 0–0)
PLATELET # BLD AUTO: 171 K/UL — SIGNIFICANT CHANGE UP (ref 150–400)
PT BLD: 10.6 SEC
RBC # BLD: 3.72 M/UL — LOW (ref 4.2–5.8)
RBC # FLD: 14.6 % — HIGH (ref 10.3–14.5)
WBC # BLD: 5.77 K/UL — SIGNIFICANT CHANGE UP (ref 3.8–10.5)
WBC # FLD AUTO: 5.77 K/UL — SIGNIFICANT CHANGE UP (ref 3.8–10.5)

## 2020-06-11 PROCEDURE — 78815 PET IMAGE W/CT SKULL-THIGH: CPT

## 2020-06-11 PROCEDURE — 78815 PET IMAGE W/CT SKULL-THIGH: CPT | Mod: 26,PS

## 2020-06-11 PROCEDURE — A9587: CPT

## 2020-06-12 LAB
ALBUMIN SERPL ELPH-MCNC: 4.2 G/DL
ALP BLD-CCNC: 124 U/L
ALT SERPL-CCNC: 16 U/L
ANION GAP SERPL CALC-SCNC: 14 MMOL/L
AST SERPL-CCNC: 17 U/L
BILIRUB SERPL-MCNC: 0.3 MG/DL
BUN SERPL-MCNC: 50 MG/DL
CALCIUM SERPL-MCNC: 9.1 MG/DL
CHLORIDE SERPL-SCNC: 107 MMOL/L
CO2 SERPL-SCNC: 19 MMOL/L
CREAT SERPL-MCNC: 3.04 MG/DL
GLUCOSE SERPL-MCNC: 102 MG/DL
POTASSIUM SERPL-SCNC: 5.5 MMOL/L
PROT SERPL-MCNC: 6.7 G/DL
SODIUM SERPL-SCNC: 139 MMOL/L

## 2020-06-16 LAB — CGA SERPL-MCNC: 8069 NG/ML

## 2020-06-17 ENCOUNTER — APPOINTMENT (OUTPATIENT)
Dept: INTERVENTIONAL RADIOLOGY/VASCULAR | Facility: CLINIC | Age: 75
End: 2020-06-17
Payer: COMMERCIAL

## 2020-06-17 ENCOUNTER — RESULT REVIEW (OUTPATIENT)
Age: 75
End: 2020-06-17

## 2020-06-17 ENCOUNTER — APPOINTMENT (OUTPATIENT)
Age: 75
End: 2020-06-17

## 2020-06-17 VITALS
BODY MASS INDEX: 29.33 KG/M2 | TEMPERATURE: 97.9 F | HEART RATE: 72 BPM | WEIGHT: 198 LBS | SYSTOLIC BLOOD PRESSURE: 145 MMHG | DIASTOLIC BLOOD PRESSURE: 75 MMHG | OXYGEN SATURATION: 99 % | HEIGHT: 69 IN | RESPIRATION RATE: 16 BRPM

## 2020-06-17 LAB
ANION GAP SERPL CALC-SCNC: 14 MMOL/L — SIGNIFICANT CHANGE UP (ref 5–17)
BASOPHILS # BLD AUTO: 0.04 K/UL — SIGNIFICANT CHANGE UP (ref 0–0.2)
BASOPHILS NFR BLD AUTO: 0.6 % — SIGNIFICANT CHANGE UP (ref 0–2)
BUN SERPL-MCNC: 44 MG/DL — HIGH (ref 7–23)
CALCIUM SERPL-MCNC: 9.2 MG/DL — SIGNIFICANT CHANGE UP (ref 8.4–10.5)
CHLORIDE SERPL-SCNC: 104 MMOL/L — SIGNIFICANT CHANGE UP (ref 96–108)
CO2 SERPL-SCNC: 20 MMOL/L — LOW (ref 22–31)
CREAT SERPL-MCNC: 3 MG/DL — HIGH (ref 0.5–1.3)
EOSINOPHIL # BLD AUTO: 0.24 K/UL — SIGNIFICANT CHANGE UP (ref 0–0.5)
EOSINOPHIL NFR BLD AUTO: 3.5 % — SIGNIFICANT CHANGE UP (ref 0–6)
GLUCOSE SERPL-MCNC: 105 MG/DL — HIGH (ref 70–99)
HCT VFR BLD CALC: 35.4 % — LOW (ref 39–50)
HGB BLD-MCNC: 11.6 G/DL — LOW (ref 13–17)
IMM GRANULOCYTES NFR BLD AUTO: 0.4 % — SIGNIFICANT CHANGE UP (ref 0–1.5)
LYMPHOCYTES # BLD AUTO: 1.05 K/UL — SIGNIFICANT CHANGE UP (ref 1–3.3)
LYMPHOCYTES # BLD AUTO: 15.5 % — SIGNIFICANT CHANGE UP (ref 13–44)
MCHC RBC-ENTMCNC: 29.4 PG — SIGNIFICANT CHANGE UP (ref 27–34)
MCHC RBC-ENTMCNC: 32.8 GM/DL — SIGNIFICANT CHANGE UP (ref 32–36)
MCV RBC AUTO: 89.8 FL — SIGNIFICANT CHANGE UP (ref 80–100)
MONOCYTES # BLD AUTO: 0.65 K/UL — SIGNIFICANT CHANGE UP (ref 0–0.9)
MONOCYTES NFR BLD AUTO: 9.6 % — SIGNIFICANT CHANGE UP (ref 2–14)
NEUTROPHILS # BLD AUTO: 4.77 K/UL — SIGNIFICANT CHANGE UP (ref 1.8–7.4)
NEUTROPHILS NFR BLD AUTO: 70.4 % — SIGNIFICANT CHANGE UP (ref 43–77)
NRBC # BLD: 0 /100 WBCS — SIGNIFICANT CHANGE UP (ref 0–0)
PLATELET # BLD AUTO: 184 K/UL — SIGNIFICANT CHANGE UP (ref 150–400)
POTASSIUM SERPL-MCNC: 5.8 MMOL/L — HIGH (ref 3.5–5.3)
POTASSIUM SERPL-SCNC: 5.8 MMOL/L — HIGH (ref 3.5–5.3)
RBC # BLD: 3.94 M/UL — LOW (ref 4.2–5.8)
RBC # FLD: 14.5 % — SIGNIFICANT CHANGE UP (ref 10.3–14.5)
SODIUM SERPL-SCNC: 138 MMOL/L — SIGNIFICANT CHANGE UP (ref 135–145)
WBC # BLD: 6.78 K/UL — SIGNIFICANT CHANGE UP (ref 3.8–10.5)
WBC # FLD AUTO: 6.78 K/UL — SIGNIFICANT CHANGE UP (ref 3.8–10.5)

## 2020-06-17 PROCEDURE — 99204 OFFICE O/P NEW MOD 45 MIN: CPT

## 2020-06-17 PROCEDURE — 99215 OFFICE O/P EST HI 40 MIN: CPT

## 2020-06-17 RX ORDER — SODIUM POLYSTYRENE SULFONATE 15 G/60ML
15 SUSPENSION ORAL; RECTAL
Qty: 1 | Refills: 0 | Status: COMPLETED | COMMUNITY
Start: 2019-12-31 | End: 2020-06-17

## 2020-06-17 RX ORDER — COLCHICINE 0.6 MG/1
0.6 TABLET ORAL
Qty: 30 | Refills: 11 | Status: DISCONTINUED | COMMUNITY
Start: 2017-03-07 | End: 2020-06-17

## 2020-06-23 ENCOUNTER — APPOINTMENT (OUTPATIENT)
Dept: NEPHROLOGY | Facility: CLINIC | Age: 75
End: 2020-06-23
Payer: COMMERCIAL

## 2020-06-23 PROCEDURE — 99215 OFFICE O/P EST HI 40 MIN: CPT | Mod: 95

## 2020-06-23 NOTE — ASSESSMENT
[FreeTextEntry1] : 74 yo male with HTN, proteinuria and CKD4, hyperkalemia, gout\par CKD4 in setting of HTN: Higher than baseline renal function. Will need to repeat labs with increased fluid intake and keeping off diuretics. \par I discussed the risk of contrast nephropathy from possible hepatic embolization. Would first want his Cr back to baseline, then would recommend staged procedure with IV hydration ana-procedure and trend Cr post procedure 24-48 hours\par Hyperkalemia: repeat K this week. He will try to go tomorrow.  Keep off ACE for now\par HTN: improved control.  Continue hydralazine 50mg BID along with other meds\par He was advised Na restriction and lifestyle modification \par Hyperkalemia: low K diet. Continue lokelma every other day. Hold Eplerenone 25 mg daily  \par Hold loop diuretic and use as needed\par Edema likely secondary to amlodipine effect and Na indiscretion\par Gout : no recent flares\par Proteinuria: Stable despite being off ACE\par Anemia: stable\par Discuss with pt and wife at length and agreeable to plan

## 2020-06-23 NOTE — HISTORY OF PRESENT ILLNESS
[Home] : at home, [unfilled] , at the time of the visit. 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 \par Was referred to IR for possible ablation of liver lesions\par However did have hyperkalemia and high K recent bloodwork\par He was advised by me last week to stop the aldactone and start lokelma every other day. \par He started taking it over the weekend. \par BP has been 140/80

## 2020-06-24 ENCOUNTER — RESULT REVIEW (OUTPATIENT)
Age: 75
End: 2020-06-24

## 2020-06-24 ENCOUNTER — APPOINTMENT (OUTPATIENT)
Age: 75
End: 2020-06-24

## 2020-06-24 LAB
BASOPHILS # BLD AUTO: 0.04 K/UL — SIGNIFICANT CHANGE UP (ref 0–0.2)
BASOPHILS NFR BLD AUTO: 0.6 % — SIGNIFICANT CHANGE UP (ref 0–2)
EOSINOPHIL # BLD AUTO: 0.3 K/UL — SIGNIFICANT CHANGE UP (ref 0–0.5)
EOSINOPHIL NFR BLD AUTO: 4.8 % — SIGNIFICANT CHANGE UP (ref 0–6)
HCT VFR BLD CALC: 31.7 % — LOW (ref 39–50)
HGB BLD-MCNC: 10.3 G/DL — LOW (ref 13–17)
IMM GRANULOCYTES NFR BLD AUTO: 0.3 % — SIGNIFICANT CHANGE UP (ref 0–1.5)
LYMPHOCYTES # BLD AUTO: 0.99 K/UL — LOW (ref 1–3.3)
LYMPHOCYTES # BLD AUTO: 15.8 % — SIGNIFICANT CHANGE UP (ref 13–44)
MCHC RBC-ENTMCNC: 29.5 PG — SIGNIFICANT CHANGE UP (ref 27–34)
MCHC RBC-ENTMCNC: 32.5 GM/DL — SIGNIFICANT CHANGE UP (ref 32–36)
MCV RBC AUTO: 90.8 FL — SIGNIFICANT CHANGE UP (ref 80–100)
MONOCYTES # BLD AUTO: 0.61 K/UL — SIGNIFICANT CHANGE UP (ref 0–0.9)
MONOCYTES NFR BLD AUTO: 9.8 % — SIGNIFICANT CHANGE UP (ref 2–14)
NEUTROPHILS # BLD AUTO: 4.29 K/UL — SIGNIFICANT CHANGE UP (ref 1.8–7.4)
NEUTROPHILS NFR BLD AUTO: 68.7 % — SIGNIFICANT CHANGE UP (ref 43–77)
NRBC # BLD: 0 /100 WBCS — SIGNIFICANT CHANGE UP (ref 0–0)
PLATELET # BLD AUTO: 174 K/UL — SIGNIFICANT CHANGE UP (ref 150–400)
RBC # BLD: 3.49 M/UL — LOW (ref 4.2–5.8)
RBC # FLD: 14.3 % — SIGNIFICANT CHANGE UP (ref 10.3–14.5)
WBC # BLD: 6.25 K/UL — SIGNIFICANT CHANGE UP (ref 3.8–10.5)
WBC # FLD AUTO: 6.25 K/UL — SIGNIFICANT CHANGE UP (ref 3.8–10.5)

## 2020-06-25 LAB
ALBUMIN SERPL ELPH-MCNC: 4 G/DL
ANION GAP SERPL CALC-SCNC: 15 MMOL/L
BUN SERPL-MCNC: 45 MG/DL
CALCIUM SERPL-MCNC: 8.7 MG/DL
CHLORIDE SERPL-SCNC: 105 MMOL/L
CO2 SERPL-SCNC: 19 MMOL/L
CREAT SERPL-MCNC: 2.65 MG/DL
GLUCOSE SERPL-MCNC: 80 MG/DL
PHOSPHATE SERPL-MCNC: 4 MG/DL
POTASSIUM SERPL-SCNC: 4.8 MMOL/L
SODIUM SERPL-SCNC: 140 MMOL/L

## 2020-07-03 DIAGNOSIS — Z01.818 ENCOUNTER FOR OTHER PREPROCEDURAL EXAMINATION: ICD-10-CM

## 2020-07-04 ENCOUNTER — APPOINTMENT (OUTPATIENT)
Dept: DISASTER EMERGENCY | Facility: CLINIC | Age: 75
End: 2020-07-04

## 2020-07-07 ENCOUNTER — OUTPATIENT (OUTPATIENT)
Dept: OUTPATIENT SERVICES | Facility: HOSPITAL | Age: 75
LOS: 1 days | Discharge: HOME | End: 2020-07-07

## 2020-07-07 DIAGNOSIS — K76.9 LIVER DISEASE, UNSPECIFIED: ICD-10-CM

## 2020-07-07 RX ORDER — SODIUM ZIRCONIUM CYCLOSILICATE 10 G/10G
0 POWDER, FOR SUSPENSION ORAL
Qty: 0 | Refills: 0 | DISCHARGE

## 2020-07-08 ENCOUNTER — APPOINTMENT (OUTPATIENT)
Age: 75
End: 2020-07-08

## 2020-07-08 ENCOUNTER — APPOINTMENT (OUTPATIENT)
Dept: DISASTER EMERGENCY | Facility: CLINIC | Age: 75
End: 2020-07-08

## 2020-07-08 ENCOUNTER — OUTPATIENT (OUTPATIENT)
Dept: OUTPATIENT SERVICES | Facility: HOSPITAL | Age: 75
LOS: 1 days | End: 2020-07-08
Payer: COMMERCIAL

## 2020-07-08 VITALS
HEIGHT: 67 IN | DIASTOLIC BLOOD PRESSURE: 70 MMHG | RESPIRATION RATE: 16 BRPM | OXYGEN SATURATION: 98 % | WEIGHT: 192.02 LBS | TEMPERATURE: 98 F | HEART RATE: 70 BPM | SYSTOLIC BLOOD PRESSURE: 159 MMHG

## 2020-07-08 DIAGNOSIS — Z90.49 ACQUIRED ABSENCE OF OTHER SPECIFIED PARTS OF DIGESTIVE TRACT: Chronic | ICD-10-CM

## 2020-07-08 DIAGNOSIS — G47.33 OBSTRUCTIVE SLEEP APNEA (ADULT) (PEDIATRIC): ICD-10-CM

## 2020-07-08 DIAGNOSIS — N18.9 CHRONIC KIDNEY DISEASE, UNSPECIFIED: ICD-10-CM

## 2020-07-08 DIAGNOSIS — C22.9 MALIGNANT NEOPLASM OF LIVER, NOT SPECIFIED AS PRIMARY OR SECONDARY: ICD-10-CM

## 2020-07-08 DIAGNOSIS — Z90.79 ACQUIRED ABSENCE OF OTHER GENITAL ORGAN(S): Chronic | ICD-10-CM

## 2020-07-08 LAB
ALBUMIN SERPL ELPH-MCNC: 4 G/DL — SIGNIFICANT CHANGE UP (ref 3.3–5)
ALP SERPL-CCNC: 131 U/L — HIGH (ref 40–120)
ALT FLD-CCNC: 22 U/L — SIGNIFICANT CHANGE UP (ref 4–41)
ANION GAP SERPL CALC-SCNC: 14 MMO/L — SIGNIFICANT CHANGE UP (ref 7–14)
APTT BLD: 30.4 SEC — SIGNIFICANT CHANGE UP (ref 27–36.3)
AST SERPL-CCNC: 22 U/L — SIGNIFICANT CHANGE UP (ref 4–40)
BILIRUB SERPL-MCNC: 0.3 MG/DL — SIGNIFICANT CHANGE UP (ref 0.2–1.2)
BUN SERPL-MCNC: 38 MG/DL — HIGH (ref 7–23)
CALCIUM SERPL-MCNC: 9.2 MG/DL — SIGNIFICANT CHANGE UP (ref 8.4–10.5)
CHLORIDE SERPL-SCNC: 103 MMOL/L — SIGNIFICANT CHANGE UP (ref 98–107)
CO2 SERPL-SCNC: 22 MMOL/L — SIGNIFICANT CHANGE UP (ref 22–31)
CREAT SERPL-MCNC: 2.66 MG/DL — HIGH (ref 0.5–1.3)
GLUCOSE SERPL-MCNC: 87 MG/DL — SIGNIFICANT CHANGE UP (ref 70–99)
HCT VFR BLD CALC: 33.2 % — LOW (ref 39–50)
HGB BLD-MCNC: 10.8 G/DL — LOW (ref 13–17)
INR BLD: 0.93 — SIGNIFICANT CHANGE UP (ref 0.88–1.17)
MCHC RBC-ENTMCNC: 29.6 PG — SIGNIFICANT CHANGE UP (ref 27–34)
MCHC RBC-ENTMCNC: 32.5 % — SIGNIFICANT CHANGE UP (ref 32–36)
MCV RBC AUTO: 91 FL — SIGNIFICANT CHANGE UP (ref 80–100)
NRBC # FLD: 0 K/UL — SIGNIFICANT CHANGE UP (ref 0–0)
PLATELET # BLD AUTO: 177 K/UL — SIGNIFICANT CHANGE UP (ref 150–400)
PMV BLD: 11.9 FL — SIGNIFICANT CHANGE UP (ref 7–13)
POTASSIUM SERPL-MCNC: 4.6 MMOL/L — SIGNIFICANT CHANGE UP (ref 3.5–5.3)
POTASSIUM SERPL-SCNC: 4.6 MMOL/L — SIGNIFICANT CHANGE UP (ref 3.5–5.3)
PROT SERPL-MCNC: 6.8 G/DL — SIGNIFICANT CHANGE UP (ref 6–8.3)
PROTHROM AB SERPL-ACNC: 10.7 SEC — SIGNIFICANT CHANGE UP (ref 9.8–13.1)
RBC # BLD: 3.65 M/UL — LOW (ref 4.2–5.8)
RBC # FLD: 14.3 % — SIGNIFICANT CHANGE UP (ref 10.3–14.5)
SODIUM SERPL-SCNC: 139 MMOL/L — SIGNIFICANT CHANGE UP (ref 135–145)
WBC # BLD: 5.68 K/UL — SIGNIFICANT CHANGE UP (ref 3.8–10.5)
WBC # FLD AUTO: 5.68 K/UL — SIGNIFICANT CHANGE UP (ref 3.8–10.5)

## 2020-07-08 PROCEDURE — 93010 ELECTROCARDIOGRAM REPORT: CPT

## 2020-07-08 RX ORDER — EPLERENONE 50 MG/1
1 TABLET, FILM COATED ORAL
Qty: 0 | Refills: 0 | DISCHARGE

## 2020-07-08 RX ORDER — SODIUM CHLORIDE 9 MG/ML
1000 INJECTION, SOLUTION INTRAVENOUS
Refills: 0 | Status: DISCONTINUED | OUTPATIENT
Start: 2020-07-23 | End: 2020-08-07

## 2020-07-08 RX ORDER — DOXAZOSIN MESYLATE 4 MG
1 TABLET ORAL
Qty: 0 | Refills: 0 | DISCHARGE

## 2020-07-08 RX ORDER — AMLODIPINE BESYLATE 2.5 MG/1
1 TABLET ORAL
Qty: 0 | Refills: 0 | DISCHARGE

## 2020-07-08 NOTE — H&P PST ADULT - NSANTHOSAYNRD_GEN_A_CORE
No. MARA screening performed.  STOP BANG Legend: 0-2 = LOW Risk; 3-4 = INTERMEDIATE Risk; 5-8 = HIGH Risk

## 2020-07-08 NOTE — H&P PST ADULT - HISTORY OF PRESENT ILLNESS
Pt is a 75 y.o. male ; pt with h/o HTN, BPH, CKD . Pt reports oncological surgery 2012 [ pt unclear site ] Pt states f/u every 6 months last 11/19 . Pt states an MRI reports " tumor of liver " Pt to surgeon ; pt now presents for Mapping & Sirt

## 2020-07-08 NOTE — H&P PST ADULT - NSICDXPASTSURGICALHX_GEN_ALL_CORE_FT
PAST SURGICAL HISTORY:  Disorder of gallbladder Gallbladder problem    History of surgery to major organs, presenting hazards to health H/O inguinal hernia repair PAST SURGICAL HISTORY:  Disorder of gallbladder s/p Laparoscopic Cholecystectomy 2012    History of surgery to major organs, presenting hazards to health H/O inguinal hernia repair    S/P appendectomy     S/P prostatectomy

## 2020-07-08 NOTE — H&P PST ADULT - NSICDXPASTMEDICALHX_GEN_ALL_CORE_FT
PAST MEDICAL HISTORY:  BPH (benign prostatic hyperplasia)     Cardiac murmur     Cataract, right     CKD (chronic kidney disease)     Essential hypertension Hypertension    Gout     History of hyperlipidemia     Proteinuria PAST MEDICAL HISTORY:  BPH (benign prostatic hyperplasia)     Cardiac murmur last echo 11/19    Cataract, right     CKD (chronic kidney disease)     Essential hypertension Hypertension    Gout x1    H/O hyperkalemia Lokelma recently initiated [ 2 weeks ago ]    History of hyperlipidemia     Prostate cancer 2013    Proteinuria

## 2020-07-08 NOTE — H&P PST ADULT - NSICDXPROBLEM_GEN_ALL_CORE_FT
PROBLEM DIAGNOSES  Problem: Liver cancer  Assessment and Plan: Mapping & SIRT  Pre op instructions reviewed with pt ; pt verbalized good understanding of pre op instructions      Problem: CKD (chronic kidney disease)  Assessment and Plan: PROBLEM DIAGNOSES  Problem: CKD (chronic kidney disease)  Assessment and Plan:     Problem: Liver cancer  Assessment and Plan: Mapping & SIRT  Pre op instructions reviewed with pt ; pt verbalized good understanding of pre op instructions PROBLEM DIAGNOSES  Problem: MARA (obstructive sleep apnea)  Assessment and Plan:     Problem: CKD (chronic kidney disease)  Assessment and Plan:     Problem: Liver cancer  Assessment and Plan: Mapping & SIRT  Pre op instructions reviewed with pt ; pt verbalized good understanding of pre op instructions PROBLEM DIAGNOSES  Problem: Liver cancer  Assessment and Plan: Mapping & SIRT  Pre op instructions reviewed with pt ; pt verbalized good understanding of pre op instructions    Dr Herman to provide pre op medical evaluation     Problem: MARA (obstructive sleep apnea)  Assessment and Plan: OR booking notified via fax     Problem: CKD (chronic kidney disease)  Assessment and Plan: Dr Herman to provide pre op medical evaluation PROBLEM DIAGNOSES  Problem: Liver cancer  Assessment and Plan: Mapping & SIRT  Pre op instructions reviewed with pt ; pt verbalized good understanding of pre op instructions    Dr Herman to provide pre op medical evaluation   ADD; 6 PM  s/w pt clarified timing of pills ;am Hydroxyzine and amlodipine ; pt to take dos. Doxazosin evening prior to surgery as per usual     Problem: MARA (obstructive sleep apnea)  Assessment and Plan: OR booking notified via fax     Problem: CKD (chronic kidney disease)  Assessment and Plan: Dr Herman to provide pre op medical evaluation PROBLEM DIAGNOSES  Problem: Liver cancer  Assessment and Plan: Mapping & SIRT  Pre op instructions reviewed with pt ; pt verbalized good understanding of pre op instructions    Dr Herman to provide pre op medical evaluation   EKG reviewed with Dr Hansen   ADD; 6 PM  s/w pt clarified timing of pills ;am Hydroxyzine and amlodipine ; pt to take dos. Doxazosin evening prior to surgery as per usual     Problem: MARA (obstructive sleep apnea)  Assessment and Plan: OR booking notified via fax     Problem: CKD (chronic kidney disease)  Assessment and Plan: Dr Herman to provide pre op medical evaluation

## 2020-07-09 DIAGNOSIS — C7A.8 OTHER MALIGNANT NEUROENDOCRINE TUMORS: ICD-10-CM

## 2020-07-21 ENCOUNTER — APPOINTMENT (OUTPATIENT)
Dept: DISASTER EMERGENCY | Facility: CLINIC | Age: 75
End: 2020-07-21

## 2020-07-21 ENCOUNTER — LABORATORY RESULT (OUTPATIENT)
Age: 75
End: 2020-07-21

## 2020-07-23 ENCOUNTER — RESULT REVIEW (OUTPATIENT)
Age: 75
End: 2020-07-23

## 2020-07-23 ENCOUNTER — OUTPATIENT (OUTPATIENT)
Dept: OUTPATIENT SERVICES | Facility: HOSPITAL | Age: 75
LOS: 1 days | End: 2020-07-23
Payer: COMMERCIAL

## 2020-07-23 ENCOUNTER — APPOINTMENT (OUTPATIENT)
Dept: NUCLEAR MEDICINE | Facility: HOSPITAL | Age: 75
End: 2020-07-23

## 2020-07-23 DIAGNOSIS — C22.9 MALIGNANT NEOPLASM OF LIVER, NOT SPECIFIED AS PRIMARY OR SECONDARY: ICD-10-CM

## 2020-07-23 DIAGNOSIS — Z90.49 ACQUIRED ABSENCE OF OTHER SPECIFIED PARTS OF DIGESTIVE TRACT: Chronic | ICD-10-CM

## 2020-07-23 DIAGNOSIS — K76.9 LIVER DISEASE, UNSPECIFIED: ICD-10-CM

## 2020-07-23 DIAGNOSIS — Z90.79 ACQUIRED ABSENCE OF OTHER GENITAL ORGAN(S): Chronic | ICD-10-CM

## 2020-07-23 DIAGNOSIS — Z00.00 ENCOUNTER FOR GENERAL ADULT MEDICAL EXAMINATION W/OUT ABNORMAL FINDINGS: ICD-10-CM

## 2020-07-23 PROCEDURE — 75774 ARTERY X-RAY EACH VESSEL: CPT | Mod: 26

## 2020-07-23 PROCEDURE — 76937 US GUIDE VASCULAR ACCESS: CPT | Mod: 26

## 2020-07-23 PROCEDURE — 36248 INS CATH ABD/L-EXT ART ADDL: CPT

## 2020-07-23 PROCEDURE — 36245 INS CATH ABD/L-EXT ART 1ST: CPT | Mod: XS

## 2020-07-23 PROCEDURE — 78201 LIVER IMAGING STATIC ONLY: CPT | Mod: 26

## 2020-07-23 PROCEDURE — 36247 INS CATH ABD/L-EXT ART 3RD: CPT

## 2020-07-23 PROCEDURE — 75726 ARTERY X-RAYS ABDOMEN: CPT | Mod: 26,59

## 2020-07-23 RX ORDER — SODIUM CHLORIDE 9 MG/ML
1000 INJECTION INTRAMUSCULAR; INTRAVENOUS; SUBCUTANEOUS
Refills: 0 | Status: DISCONTINUED | OUTPATIENT
Start: 2020-07-23 | End: 2020-08-07

## 2020-07-25 ENCOUNTER — APPOINTMENT (OUTPATIENT)
Dept: MRI IMAGING | Facility: HOSPITAL | Age: 75
End: 2020-07-25
Payer: COMMERCIAL

## 2020-07-25 ENCOUNTER — RESULT REVIEW (OUTPATIENT)
Age: 75
End: 2020-07-25

## 2020-07-25 ENCOUNTER — OUTPATIENT (OUTPATIENT)
Dept: OUTPATIENT SERVICES | Facility: HOSPITAL | Age: 75
LOS: 1 days | End: 2020-07-25

## 2020-07-25 ENCOUNTER — APPOINTMENT (OUTPATIENT)
Dept: DISASTER EMERGENCY | Facility: CLINIC | Age: 75
End: 2020-07-25

## 2020-07-25 DIAGNOSIS — Z90.79 ACQUIRED ABSENCE OF OTHER GENITAL ORGAN(S): Chronic | ICD-10-CM

## 2020-07-25 DIAGNOSIS — D3A.8 OTHER BENIGN NEUROENDOCRINE TUMORS: ICD-10-CM

## 2020-07-25 DIAGNOSIS — Z90.49 ACQUIRED ABSENCE OF OTHER SPECIFIED PARTS OF DIGESTIVE TRACT: Chronic | ICD-10-CM

## 2020-07-25 LAB — SARS-COV-2 N GENE NPH QL NAA+PROBE: NOT DETECTED

## 2020-07-25 PROCEDURE — 72197 MRI PELVIS W/O & W/DYE: CPT | Mod: 26

## 2020-07-25 PROCEDURE — 74183 MRI ABD W/O CNTR FLWD CNTR: CPT | Mod: 26

## 2020-07-27 ENCOUNTER — RX RENEWAL (OUTPATIENT)
Age: 75
End: 2020-07-27

## 2020-07-27 ENCOUNTER — RESULT REVIEW (OUTPATIENT)
Age: 75
End: 2020-07-27

## 2020-07-27 ENCOUNTER — OUTPATIENT (OUTPATIENT)
Dept: OUTPATIENT SERVICES | Facility: HOSPITAL | Age: 75
LOS: 1 days | End: 2020-07-27
Payer: COMMERCIAL

## 2020-07-27 DIAGNOSIS — Z90.49 ACQUIRED ABSENCE OF OTHER SPECIFIED PARTS OF DIGESTIVE TRACT: Chronic | ICD-10-CM

## 2020-07-27 DIAGNOSIS — C22.9 MALIGNANT NEOPLASM OF LIVER, NOT SPECIFIED AS PRIMARY OR SECONDARY: ICD-10-CM

## 2020-07-27 DIAGNOSIS — Z90.79 ACQUIRED ABSENCE OF OTHER GENITAL ORGAN(S): Chronic | ICD-10-CM

## 2020-07-27 PROBLEM — Z86.39 PERSONAL HISTORY OF OTHER ENDOCRINE, NUTRITIONAL AND METABOLIC DISEASE: Chronic | Status: ACTIVE | Noted: 2020-07-08

## 2020-07-27 PROBLEM — C61 MALIGNANT NEOPLASM OF PROSTATE: Chronic | Status: ACTIVE | Noted: 2020-07-08

## 2020-07-27 PROBLEM — M10.9 GOUT, UNSPECIFIED: Chronic | Status: ACTIVE | Noted: 2020-02-14

## 2020-07-27 PROBLEM — R01.1 CARDIAC MURMUR, UNSPECIFIED: Chronic | Status: ACTIVE | Noted: 2020-02-14

## 2020-07-27 LAB
ALBUMIN SERPL ELPH-MCNC: 3.9 G/DL — SIGNIFICANT CHANGE UP (ref 3.3–5)
ALP SERPL-CCNC: 144 U/L — HIGH (ref 40–120)
ALT FLD-CCNC: 19 U/L — SIGNIFICANT CHANGE UP (ref 4–41)
ANION GAP SERPL CALC-SCNC: 12 MMO/L — SIGNIFICANT CHANGE UP (ref 7–14)
APTT BLD: 30.2 SEC — SIGNIFICANT CHANGE UP (ref 27–36.3)
AST SERPL-CCNC: 15 U/L — SIGNIFICANT CHANGE UP (ref 4–40)
BILIRUB SERPL-MCNC: 0.3 MG/DL — SIGNIFICANT CHANGE UP (ref 0.2–1.2)
BUN SERPL-MCNC: 38 MG/DL — HIGH (ref 7–23)
CALCIUM SERPL-MCNC: 9 MG/DL — SIGNIFICANT CHANGE UP (ref 8.4–10.5)
CHLORIDE SERPL-SCNC: 104 MMOL/L — SIGNIFICANT CHANGE UP (ref 98–107)
CO2 SERPL-SCNC: 25 MMOL/L — SIGNIFICANT CHANGE UP (ref 22–31)
CREAT SERPL-MCNC: 2.55 MG/DL — HIGH (ref 0.5–1.3)
GLUCOSE SERPL-MCNC: 123 MG/DL — HIGH (ref 70–99)
HCT VFR BLD CALC: 34.4 % — LOW (ref 39–50)
HGB BLD-MCNC: 10.5 G/DL — LOW (ref 13–17)
INR BLD: 0.95 — SIGNIFICANT CHANGE UP (ref 0.88–1.17)
MCHC RBC-ENTMCNC: 28.2 PG — SIGNIFICANT CHANGE UP (ref 27–34)
MCHC RBC-ENTMCNC: 30.5 % — LOW (ref 32–36)
MCV RBC AUTO: 92.2 FL — SIGNIFICANT CHANGE UP (ref 80–100)
NRBC # FLD: 0 K/UL — SIGNIFICANT CHANGE UP (ref 0–0)
PLATELET # BLD AUTO: 197 K/UL — SIGNIFICANT CHANGE UP (ref 150–400)
PMV BLD: 11.2 FL — SIGNIFICANT CHANGE UP (ref 7–13)
POTASSIUM SERPL-MCNC: 4.4 MMOL/L — SIGNIFICANT CHANGE UP (ref 3.5–5.3)
POTASSIUM SERPL-SCNC: 4.4 MMOL/L — SIGNIFICANT CHANGE UP (ref 3.5–5.3)
PROT SERPL-MCNC: 6.7 G/DL — SIGNIFICANT CHANGE UP (ref 6–8.3)
PROTHROM AB SERPL-ACNC: 10.9 SEC — SIGNIFICANT CHANGE UP (ref 9.8–13.1)
RBC # BLD: 3.73 M/UL — LOW (ref 4.2–5.8)
RBC # FLD: 13.9 % — SIGNIFICANT CHANGE UP (ref 10.3–14.5)
SODIUM SERPL-SCNC: 141 MMOL/L — SIGNIFICANT CHANGE UP (ref 135–145)
WBC # BLD: 7.63 K/UL — SIGNIFICANT CHANGE UP (ref 3.8–10.5)
WBC # FLD AUTO: 7.63 K/UL — SIGNIFICANT CHANGE UP (ref 3.8–10.5)

## 2020-07-27 PROCEDURE — 76937 US GUIDE VASCULAR ACCESS: CPT | Mod: 26

## 2020-07-27 PROCEDURE — 37243 VASC EMBOLIZE/OCCLUDE ORGAN: CPT

## 2020-07-27 PROCEDURE — 77263 THER RADIOLOGY TX PLNG CPLX: CPT

## 2020-07-27 PROCEDURE — 79445 NUCLEAR RX INTRA-ARTERIAL: CPT | Mod: 26

## 2020-07-27 PROCEDURE — 36247 INS CATH ABD/L-EXT ART 3RD: CPT

## 2020-07-27 RX ORDER — SODIUM CHLORIDE 9 MG/ML
1000 INJECTION INTRAMUSCULAR; INTRAVENOUS; SUBCUTANEOUS
Refills: 0 | Status: DISCONTINUED | OUTPATIENT
Start: 2020-07-27 | End: 2020-07-27

## 2020-07-27 RX ORDER — OCTREOTIDE ACETATE 200 UG/ML
200 INJECTION, SOLUTION INTRAVENOUS; SUBCUTANEOUS ONCE
Refills: 0 | Status: DISCONTINUED | OUTPATIENT
Start: 2020-07-27 | End: 2020-08-11

## 2020-07-27 RX ORDER — SODIUM CHLORIDE 9 MG/ML
1000 INJECTION INTRAMUSCULAR; INTRAVENOUS; SUBCUTANEOUS
Refills: 0 | Status: DISCONTINUED | OUTPATIENT
Start: 2020-07-27 | End: 2020-08-11

## 2020-07-27 RX ORDER — SODIUM CHLORIDE 9 MG/ML
3 INJECTION INTRAMUSCULAR; INTRAVENOUS; SUBCUTANEOUS ONCE
Refills: 0 | Status: DISCONTINUED | OUTPATIENT
Start: 2020-07-27 | End: 2020-08-11

## 2020-07-27 RX ORDER — PANTOPRAZOLE SODIUM 20 MG/1
40 TABLET, DELAYED RELEASE ORAL ONCE
Refills: 0 | Status: DISCONTINUED | OUTPATIENT
Start: 2020-07-27 | End: 2020-08-11

## 2020-07-27 RX ADMIN — SODIUM CHLORIDE 80 MILLILITER(S): 9 INJECTION INTRAMUSCULAR; INTRAVENOUS; SUBCUTANEOUS at 09:29

## 2020-07-29 LAB — CGA FLD-MCNC: HIGH NG/ML

## 2020-08-03 DIAGNOSIS — C78.7 SECONDARY MALIGNANT NEOPLASM OF LIVER AND INTRAHEPATIC BILE DUCT: ICD-10-CM

## 2020-08-03 DIAGNOSIS — C7A.8 OTHER MALIGNANT NEUROENDOCRINE TUMORS: ICD-10-CM

## 2020-08-04 DIAGNOSIS — D3A.8 OTHER BENIGN NEUROENDOCRINE TUMORS: ICD-10-CM

## 2020-08-05 ENCOUNTER — APPOINTMENT (OUTPATIENT)
Age: 75
End: 2020-08-05
Payer: COMMERCIAL

## 2020-08-05 ENCOUNTER — APPOINTMENT (OUTPATIENT)
Age: 75
End: 2020-08-05

## 2020-08-05 VITALS
RESPIRATION RATE: 14 BRPM | WEIGHT: 188.5 LBS | TEMPERATURE: 98 F | DIASTOLIC BLOOD PRESSURE: 68 MMHG | OXYGEN SATURATION: 99 % | HEART RATE: 65 BPM | BODY MASS INDEX: 27.84 KG/M2 | SYSTOLIC BLOOD PRESSURE: 157 MMHG

## 2020-08-05 PROCEDURE — 99213 OFFICE O/P EST LOW 20 MIN: CPT

## 2020-08-22 NOTE — HISTORY OF PRESENT ILLNESS
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 Pt initially had Q 6 mos MRI a/p, then annual scans. Previous scan in June 2018 showed stable mesenteric mass and then patient delayed his follow up scan till Nov 2019. MRI Abd/Pelvis showed new bilobar liver lesions suspicious for hepatic mets, two new lesions at T12 and L3 concerning for bone mets. Patient lives in [place] and saw Dr. Saenz, medical oncology, who recommended a liver biopsy but wanted a second opinion. 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, mesenteric and R inguinal LN, multiple anterior abdominal wall mets, \par 1/17/20: Liver bx: well diff NET, Ki 30-70% (upon re-review)\par 2/14/20: Lanreotide monthly started \par 5/15/20: CT Chest: clear lungs\par 6/5/20: MRI A/P: Interval increase in size of bilobar hepatic lesions \par 7/25/20: MRI A/P: interval incr in bilobar liver mets, rest of disease essentially unchanged, ?incr in size of T12 and femoral head mets\par 7/27/20: SIRT  [de-identified] : well differentiated NET, 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 Energy level is stable. Denies any pain. Tolerated SIRT well. Repeat labs showed stable Cr. Cont to work full time.

## 2020-08-24 ENCOUNTER — LABORATORY RESULT (OUTPATIENT)
Age: 75
End: 2020-08-24

## 2020-08-24 ENCOUNTER — RESULT REVIEW (OUTPATIENT)
Age: 75
End: 2020-08-24

## 2020-08-24 ENCOUNTER — APPOINTMENT (OUTPATIENT)
Dept: MRI IMAGING | Facility: IMAGING CENTER | Age: 75
End: 2020-08-24
Payer: COMMERCIAL

## 2020-08-24 ENCOUNTER — OUTPATIENT (OUTPATIENT)
Dept: OUTPATIENT SERVICES | Facility: HOSPITAL | Age: 75
LOS: 1 days | Discharge: HOME | End: 2020-08-24

## 2020-08-24 ENCOUNTER — APPOINTMENT (OUTPATIENT)
Age: 75
End: 2020-08-24

## 2020-08-24 ENCOUNTER — OUTPATIENT (OUTPATIENT)
Dept: OUTPATIENT SERVICES | Facility: HOSPITAL | Age: 75
LOS: 1 days | End: 2020-08-24
Payer: COMMERCIAL

## 2020-08-24 DIAGNOSIS — K76.9 LIVER DISEASE, UNSPECIFIED: ICD-10-CM

## 2020-08-24 DIAGNOSIS — Z90.49 ACQUIRED ABSENCE OF OTHER SPECIFIED PARTS OF DIGESTIVE TRACT: Chronic | ICD-10-CM

## 2020-08-24 DIAGNOSIS — D3A.8 OTHER BENIGN NEUROENDOCRINE TUMORS: ICD-10-CM

## 2020-08-24 DIAGNOSIS — Z90.79 ACQUIRED ABSENCE OF OTHER GENITAL ORGAN(S): Chronic | ICD-10-CM

## 2020-08-24 LAB
BASOPHILS # BLD AUTO: 0.04 K/UL — SIGNIFICANT CHANGE UP (ref 0–0.2)
BASOPHILS NFR BLD AUTO: 0.9 % — SIGNIFICANT CHANGE UP (ref 0–2)
EOSINOPHIL # BLD AUTO: 0.13 K/UL — SIGNIFICANT CHANGE UP (ref 0–0.5)
EOSINOPHIL NFR BLD AUTO: 2.9 % — SIGNIFICANT CHANGE UP (ref 0–6)
HCT VFR BLD CALC: 30.8 % — LOW (ref 39–50)
HGB BLD-MCNC: 9.8 G/DL — LOW (ref 13–17)
IMM GRANULOCYTES NFR BLD AUTO: 0.4 % — SIGNIFICANT CHANGE UP (ref 0–1.5)
LYMPHOCYTES # BLD AUTO: 0.33 K/UL — LOW (ref 1–3.3)
LYMPHOCYTES # BLD AUTO: 7.3 % — LOW (ref 13–44)
MCHC RBC-ENTMCNC: 29.1 PG — SIGNIFICANT CHANGE UP (ref 27–34)
MCHC RBC-ENTMCNC: 31.8 GM/DL — LOW (ref 32–36)
MCV RBC AUTO: 91.4 FL — SIGNIFICANT CHANGE UP (ref 80–100)
MONOCYTES # BLD AUTO: 0.51 K/UL — SIGNIFICANT CHANGE UP (ref 0–0.9)
MONOCYTES NFR BLD AUTO: 11.2 % — SIGNIFICANT CHANGE UP (ref 2–14)
NEUTROPHILS # BLD AUTO: 3.52 K/UL — SIGNIFICANT CHANGE UP (ref 1.8–7.4)
NEUTROPHILS NFR BLD AUTO: 77.3 % — HIGH (ref 43–77)
NRBC # BLD: 0 /100 WBCS — SIGNIFICANT CHANGE UP (ref 0–0)
PLATELET # BLD AUTO: 154 K/UL — SIGNIFICANT CHANGE UP (ref 150–400)
RBC # BLD: 3.37 M/UL — LOW (ref 4.2–5.8)
RBC # FLD: 14.5 % — SIGNIFICANT CHANGE UP (ref 10.3–14.5)
WBC # BLD: 4.55 K/UL — SIGNIFICANT CHANGE UP (ref 3.8–10.5)
WBC # FLD AUTO: 4.55 K/UL — SIGNIFICANT CHANGE UP (ref 3.8–10.5)

## 2020-08-24 PROCEDURE — 74183 MRI ABD W/O CNTR FLWD CNTR: CPT

## 2020-08-24 PROCEDURE — A9585: CPT

## 2020-08-24 PROCEDURE — 74183 MRI ABD W/O CNTR FLWD CNTR: CPT | Mod: 26

## 2020-08-26 ENCOUNTER — APPOINTMENT (OUTPATIENT)
Dept: INTERVENTIONAL RADIOLOGY/VASCULAR | Facility: CLINIC | Age: 75
End: 2020-08-26
Payer: COMMERCIAL

## 2020-08-26 PROCEDURE — XXXXX: CPT

## 2020-08-28 ENCOUNTER — APPOINTMENT (OUTPATIENT)
Dept: INTERVENTIONAL RADIOLOGY/VASCULAR | Facility: CLINIC | Age: 75
End: 2020-08-28
Payer: COMMERCIAL

## 2020-08-28 VITALS — HEIGHT: 69 IN | WEIGHT: 188 LBS | BODY MASS INDEX: 27.85 KG/M2

## 2020-08-28 PROCEDURE — 99214 OFFICE O/P EST MOD 30 MIN: CPT | Mod: 95

## 2020-08-28 RX ORDER — METHYLPREDNISOLONE 4 MG/1
4 TABLET ORAL
Qty: 1 | Refills: 0 | Status: COMPLETED | COMMUNITY
Start: 2020-07-27 | End: 2020-08-28

## 2020-09-03 ENCOUNTER — APPOINTMENT (OUTPATIENT)
Dept: NEPHROLOGY | Facility: CLINIC | Age: 75
End: 2020-09-03
Payer: COMMERCIAL

## 2020-09-03 PROCEDURE — 99214 OFFICE O/P EST MOD 30 MIN: CPT | Mod: 95

## 2020-09-03 NOTE — HISTORY OF PRESENT ILLNESS
[Home] : at home, [unfilled] , at the time of the visit. [Medical Office: ([facility])___] : at the medical office located in  [Verbal consent obtained from patient] : the patient, [unfilled] [FreeTextEntry1] : 76 yo Faro[place] male on telehealth visit for followup of CKD-4, hyperkalemia, proteinuria and hypertension.\par He is s/p radiofreq embol of liver lesions tolerated well\par Following Dr Mcgovern and Dr Boothe\par Renal function stable\par BP stable at home 140 systolic\par Taking lokelma three times a week and K stable

## 2020-09-03 NOTE — ASSESSMENT
[FreeTextEntry1] : 74 yo male with HTN, proteinuria and CKD4, hyperkalemia, gout, stage IV NET\par CKD4 in setting of HTN: Stable renal function post procedure \par Hyperkalemia: Improved.  Keep off ACE for now. Stay on lokelma three times a week\par HTN: improved control.  Continue hydralazine 50mg BID along with other meds\par He was advised Na restriction and lifestyle modification \par Holding loop diuretic and use as needed\par Edema likely secondary to amlodipine effect\par Gout : no recent flares\par Proteinuria: trend\par Anemia: trend\par Neuroendocrine tumor s/p liver radiofreq emobol- Followup with Dr Boothe and Dr Mcgovern for further management\par Same hydration procedure for IR contrast procedures\par Follow up in 3-4 months

## 2020-09-05 ENCOUNTER — APPOINTMENT (OUTPATIENT)
Dept: DISASTER EMERGENCY | Facility: CLINIC | Age: 75
End: 2020-09-05

## 2020-09-08 ENCOUNTER — APPOINTMENT (OUTPATIENT)
Dept: DISASTER EMERGENCY | Facility: CLINIC | Age: 75
End: 2020-09-08

## 2020-09-08 ENCOUNTER — APPOINTMENT (OUTPATIENT)
Age: 75
End: 2020-09-08

## 2020-09-08 DIAGNOSIS — C7A.8 OTHER MALIGNANT NEUROENDOCRINE TUMORS: ICD-10-CM

## 2020-09-09 ENCOUNTER — APPOINTMENT (OUTPATIENT)
Age: 75
End: 2020-09-09

## 2020-09-09 LAB — SARS-COV-2 N GENE NPH QL NAA+PROBE: NOT DETECTED

## 2020-09-10 ENCOUNTER — OUTPATIENT (OUTPATIENT)
Dept: OUTPATIENT SERVICES | Facility: HOSPITAL | Age: 75
LOS: 1 days | End: 2020-09-10
Payer: COMMERCIAL

## 2020-09-10 ENCOUNTER — RESULT REVIEW (OUTPATIENT)
Age: 75
End: 2020-09-10

## 2020-09-10 DIAGNOSIS — C78.7 SECONDARY MALIGNANT NEOPLASM OF LIVER AND INTRAHEPATIC BILE DUCT: ICD-10-CM

## 2020-09-10 DIAGNOSIS — Z90.49 ACQUIRED ABSENCE OF OTHER SPECIFIED PARTS OF DIGESTIVE TRACT: Chronic | ICD-10-CM

## 2020-09-10 DIAGNOSIS — Z90.79 ACQUIRED ABSENCE OF OTHER GENITAL ORGAN(S): Chronic | ICD-10-CM

## 2020-09-10 DIAGNOSIS — C7A.8 OTHER MALIGNANT NEUROENDOCRINE TUMORS: ICD-10-CM

## 2020-09-10 LAB
ALBUMIN SERPL ELPH-MCNC: 3.9 G/DL — SIGNIFICANT CHANGE UP (ref 3.3–5)
ALP SERPL-CCNC: 217 U/L — HIGH (ref 40–120)
ALT FLD-CCNC: 25 U/L — SIGNIFICANT CHANGE UP (ref 4–41)
ANION GAP SERPL CALC-SCNC: 13 MMO/L — SIGNIFICANT CHANGE UP (ref 7–14)
APTT BLD: 30.7 SEC — SIGNIFICANT CHANGE UP (ref 27–36.3)
AST SERPL-CCNC: 26 U/L — SIGNIFICANT CHANGE UP (ref 4–40)
BASOPHILS # BLD AUTO: 0.04 K/UL — SIGNIFICANT CHANGE UP (ref 0–0.2)
BASOPHILS NFR BLD AUTO: 0.6 % — SIGNIFICANT CHANGE UP (ref 0–2)
BILIRUB SERPL-MCNC: 0.3 MG/DL — SIGNIFICANT CHANGE UP (ref 0.2–1.2)
BUN SERPL-MCNC: 37 MG/DL — HIGH (ref 7–23)
CALCIUM SERPL-MCNC: 9.2 MG/DL — SIGNIFICANT CHANGE UP (ref 8.4–10.5)
CHLORIDE SERPL-SCNC: 106 MMOL/L — SIGNIFICANT CHANGE UP (ref 98–107)
CO2 SERPL-SCNC: 24 MMOL/L — SIGNIFICANT CHANGE UP (ref 22–31)
CREAT SERPL-MCNC: 2.66 MG/DL — HIGH (ref 0.5–1.3)
EOSINOPHIL # BLD AUTO: 0.21 K/UL — SIGNIFICANT CHANGE UP (ref 0–0.5)
EOSINOPHIL NFR BLD AUTO: 2.9 % — SIGNIFICANT CHANGE UP (ref 0–6)
GLUCOSE SERPL-MCNC: 120 MG/DL — HIGH (ref 70–99)
HCT VFR BLD CALC: 33.2 % — LOW (ref 39–50)
HGB BLD-MCNC: 10.4 G/DL — LOW (ref 13–17)
IMM GRANULOCYTES NFR BLD AUTO: 0.3 % — SIGNIFICANT CHANGE UP (ref 0–1.5)
INR BLD: 1.04 — SIGNIFICANT CHANGE UP (ref 0.88–1.16)
LYMPHOCYTES # BLD AUTO: 0.45 K/UL — LOW (ref 1–3.3)
LYMPHOCYTES # BLD AUTO: 6.3 % — LOW (ref 13–44)
MCHC RBC-ENTMCNC: 28.7 PG — SIGNIFICANT CHANGE UP (ref 27–34)
MCHC RBC-ENTMCNC: 31.3 % — LOW (ref 32–36)
MCV RBC AUTO: 91.7 FL — SIGNIFICANT CHANGE UP (ref 80–100)
MONOCYTES # BLD AUTO: 0.65 K/UL — SIGNIFICANT CHANGE UP (ref 0–0.9)
MONOCYTES NFR BLD AUTO: 9 % — SIGNIFICANT CHANGE UP (ref 2–14)
NEUTROPHILS # BLD AUTO: 5.82 K/UL — SIGNIFICANT CHANGE UP (ref 1.8–7.4)
NEUTROPHILS NFR BLD AUTO: 80.9 % — HIGH (ref 43–77)
NRBC # FLD: 0 K/UL — SIGNIFICANT CHANGE UP (ref 0–0)
PLATELET # BLD AUTO: 160 K/UL — SIGNIFICANT CHANGE UP (ref 150–400)
PMV BLD: 11.7 FL — SIGNIFICANT CHANGE UP (ref 7–13)
POTASSIUM SERPL-MCNC: 4.5 MMOL/L — SIGNIFICANT CHANGE UP (ref 3.5–5.3)
POTASSIUM SERPL-SCNC: 4.5 MMOL/L — SIGNIFICANT CHANGE UP (ref 3.5–5.3)
PROT SERPL-MCNC: 6.6 G/DL — SIGNIFICANT CHANGE UP (ref 6–8.3)
PROTHROM AB SERPL-ACNC: 11.8 SEC — SIGNIFICANT CHANGE UP (ref 10.6–13.6)
RBC # BLD: 3.62 M/UL — LOW (ref 4.2–5.8)
RBC # FLD: 14.6 % — HIGH (ref 10.3–14.5)
SODIUM SERPL-SCNC: 143 MMOL/L — SIGNIFICANT CHANGE UP (ref 135–145)
WBC # BLD: 7.19 K/UL — SIGNIFICANT CHANGE UP (ref 3.8–10.5)
WBC # FLD AUTO: 7.19 K/UL — SIGNIFICANT CHANGE UP (ref 3.8–10.5)

## 2020-09-10 PROCEDURE — 79445 NUCLEAR RX INTRA-ARTERIAL: CPT | Mod: 26

## 2020-09-10 PROCEDURE — 76937 US GUIDE VASCULAR ACCESS: CPT | Mod: 26

## 2020-09-10 PROCEDURE — 37243 VASC EMBOLIZE/OCCLUDE ORGAN: CPT

## 2020-09-10 PROCEDURE — 36247 INS CATH ABD/L-EXT ART 3RD: CPT

## 2020-09-10 PROCEDURE — 77263 THER RADIOLOGY TX PLNG CPLX: CPT

## 2020-09-10 RX ORDER — OCTREOTIDE ACETATE 200 UG/ML
200 INJECTION, SOLUTION INTRAVENOUS; SUBCUTANEOUS ONCE
Refills: 0 | Status: DISCONTINUED | OUTPATIENT
Start: 2020-09-10 | End: 2020-09-24

## 2020-09-12 LAB — CGA FLD-MCNC: 8933 NG/ML — HIGH

## 2020-09-18 ENCOUNTER — APPOINTMENT (OUTPATIENT)
Age: 75
End: 2020-09-18
Payer: COMMERCIAL

## 2020-09-18 PROCEDURE — 99213 OFFICE O/P EST LOW 20 MIN: CPT | Mod: 95

## 2020-09-28 ENCOUNTER — OUTPATIENT (OUTPATIENT)
Dept: OUTPATIENT SERVICES | Facility: HOSPITAL | Age: 75
LOS: 1 days | Discharge: HOME | End: 2020-09-28

## 2020-09-28 DIAGNOSIS — Z90.49 ACQUIRED ABSENCE OF OTHER SPECIFIED PARTS OF DIGESTIVE TRACT: Chronic | ICD-10-CM

## 2020-09-28 DIAGNOSIS — K76.9 LIVER DISEASE, UNSPECIFIED: ICD-10-CM

## 2020-09-28 DIAGNOSIS — Z90.79 ACQUIRED ABSENCE OF OTHER GENITAL ORGAN(S): Chronic | ICD-10-CM

## 2020-10-05 ENCOUNTER — RESULT REVIEW (OUTPATIENT)
Age: 75
End: 2020-10-05

## 2020-10-05 ENCOUNTER — APPOINTMENT (OUTPATIENT)
Age: 75
End: 2020-10-05

## 2020-10-05 ENCOUNTER — LABORATORY RESULT (OUTPATIENT)
Age: 75
End: 2020-10-05

## 2020-10-05 LAB
BASOPHILS # BLD AUTO: 0.03 K/UL — SIGNIFICANT CHANGE UP (ref 0–0.2)
BASOPHILS NFR BLD AUTO: 0.5 % — SIGNIFICANT CHANGE UP (ref 0–2)
EOSINOPHIL # BLD AUTO: 0.08 K/UL — SIGNIFICANT CHANGE UP (ref 0–0.5)
EOSINOPHIL NFR BLD AUTO: 1.3 % — SIGNIFICANT CHANGE UP (ref 0–6)
HCT VFR BLD CALC: 30.3 % — LOW (ref 39–50)
HGB BLD-MCNC: 9.5 G/DL — LOW (ref 13–17)
IMM GRANULOCYTES NFR BLD AUTO: 0.7 % — SIGNIFICANT CHANGE UP (ref 0–1.5)
LYMPHOCYTES # BLD AUTO: 0.21 K/UL — LOW (ref 1–3.3)
LYMPHOCYTES # BLD AUTO: 3.5 % — LOW (ref 13–44)
MCHC RBC-ENTMCNC: 28.4 PG — SIGNIFICANT CHANGE UP (ref 27–34)
MCHC RBC-ENTMCNC: 31.4 G/DL — LOW (ref 32–36)
MCV RBC AUTO: 90.7 FL — SIGNIFICANT CHANGE UP (ref 80–100)
MONOCYTES # BLD AUTO: 0.5 K/UL — SIGNIFICANT CHANGE UP (ref 0–0.9)
MONOCYTES NFR BLD AUTO: 8.3 % — SIGNIFICANT CHANGE UP (ref 2–14)
NEUTROPHILS # BLD AUTO: 5.13 K/UL — SIGNIFICANT CHANGE UP (ref 1.8–7.4)
NEUTROPHILS NFR BLD AUTO: 85.7 % — HIGH (ref 43–77)
NRBC # BLD: 0 /100 WBCS — SIGNIFICANT CHANGE UP (ref 0–0)
PLATELET # BLD AUTO: 203 K/UL — SIGNIFICANT CHANGE UP (ref 150–400)
RBC # BLD: 3.34 M/UL — LOW (ref 4.2–5.8)
RBC # FLD: 14.6 % — HIGH (ref 10.3–14.5)
WBC # BLD: 5.99 K/UL — SIGNIFICANT CHANGE UP (ref 3.8–10.5)
WBC # FLD AUTO: 5.99 K/UL — SIGNIFICANT CHANGE UP (ref 3.8–10.5)

## 2020-10-06 ENCOUNTER — LABORATORY RESULT (OUTPATIENT)
Age: 75
End: 2020-10-06

## 2020-10-06 ENCOUNTER — APPOINTMENT (OUTPATIENT)
Age: 75
End: 2020-10-06

## 2020-10-06 DIAGNOSIS — C7A.8 OTHER MALIGNANT NEUROENDOCRINE TUMORS: ICD-10-CM

## 2020-10-06 NOTE — PHYSICAL EXAM
[Restricted in physically strenuous activity but ambulatory and able to carry out work of a light or sedentary nature] : Status 1- Restricted in physically strenuous activity but ambulatory and able to carry out work of a light or sedentary nature, e.g., light house work, office work [Normal] : affect appropriate [de-identified] : normal respiratory pattern

## 2020-10-06 NOTE — HISTORY OF PRESENT ILLNESS
[Home] : at home, [unfilled] , at the time of the visit. 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 Pt initially had Q 6 mos MRI a/p, then annual scans. Previous scan in June 2018 showed stable mesenteric mass and then patient delayed his follow up scan till Nov 2019. MRI Abd/Pelvis showed new bilobar liver lesions suspicious for hepatic mets, two new lesions at T12 and L3 concerning for bone mets. Patient lives in [place] and saw Dr. Saenz, medical oncology, who recommended a liver biopsy but wanted a second opinion. 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, mesenteric and R inguinal LN, multiple anterior abdominal wall mets, \par 1/17/20: Liver bx: well diff NET, Ki 30-70% (upon re-review)\par 2/14/20: Lanreotide monthly started \par 5/15/20: CT Chest: clear lungs\par 6/5/20: MRI A/P: Interval increase in size of bilobar hepatic lesions \par 7/25/20: MRI A/P: interval incr in bilobar liver mets, rest of disease essentially unchanged, ?incr in size of T12 and femoral head mets\par 7/27/20: SIRT \par 8/24/20: MRI Abd: stable disease, improvement in liver mets [de-identified] : well differentiated NET, Ki67 30-70% [de-identified] : 2012 Mesenteric mass bx: well differentiated NET, Ki <3%, mitotic rate low [FreeTextEntry1] : s/p Y90 x 2 [de-identified] : Overall has been doing well. Post 2nd Y90 had several days where he felt very tired, poor appetite. Now slowly improving. Denies any pain. eating better, but not normal.

## 2020-10-11 ENCOUNTER — APPOINTMENT (OUTPATIENT)
Dept: CT IMAGING | Facility: IMAGING CENTER | Age: 75
End: 2020-10-11
Payer: COMMERCIAL

## 2020-10-11 ENCOUNTER — OUTPATIENT (OUTPATIENT)
Dept: OUTPATIENT SERVICES | Facility: HOSPITAL | Age: 75
LOS: 1 days | End: 2020-10-11
Payer: COMMERCIAL

## 2020-10-11 ENCOUNTER — APPOINTMENT (OUTPATIENT)
Dept: MRI IMAGING | Facility: IMAGING CENTER | Age: 75
End: 2020-10-11
Payer: COMMERCIAL

## 2020-10-11 DIAGNOSIS — Z90.49 ACQUIRED ABSENCE OF OTHER SPECIFIED PARTS OF DIGESTIVE TRACT: Chronic | ICD-10-CM

## 2020-10-11 DIAGNOSIS — D3A.8 OTHER BENIGN NEUROENDOCRINE TUMORS: ICD-10-CM

## 2020-10-11 DIAGNOSIS — Z90.79 ACQUIRED ABSENCE OF OTHER GENITAL ORGAN(S): Chronic | ICD-10-CM

## 2020-10-11 PROCEDURE — 71250 CT THORAX DX C-: CPT | Mod: 26

## 2020-10-11 PROCEDURE — 72197 MRI PELVIS W/O & W/DYE: CPT | Mod: 26

## 2020-10-11 PROCEDURE — 74183 MRI ABD W/O CNTR FLWD CNTR: CPT

## 2020-10-11 PROCEDURE — 74183 MRI ABD W/O CNTR FLWD CNTR: CPT | Mod: 26

## 2020-10-11 PROCEDURE — 71250 CT THORAX DX C-: CPT

## 2020-10-11 PROCEDURE — 72197 MRI PELVIS W/O & W/DYE: CPT

## 2020-10-14 ENCOUNTER — APPOINTMENT (OUTPATIENT)
Dept: INTERVENTIONAL RADIOLOGY/VASCULAR | Facility: CLINIC | Age: 75
End: 2020-10-14
Payer: COMMERCIAL

## 2020-10-14 VITALS — WEIGHT: 180 LBS | HEIGHT: 69 IN | BODY MASS INDEX: 26.66 KG/M2

## 2020-10-14 PROCEDURE — 99215 OFFICE O/P EST HI 40 MIN: CPT | Mod: 95

## 2020-10-19 ENCOUNTER — APPOINTMENT (OUTPATIENT)
Age: 75
End: 2020-10-19
Payer: COMMERCIAL

## 2020-10-19 PROCEDURE — 99213 OFFICE O/P EST LOW 20 MIN: CPT | Mod: 95

## 2020-10-30 ENCOUNTER — OUTPATIENT (OUTPATIENT)
Dept: OUTPATIENT SERVICES | Facility: HOSPITAL | Age: 75
LOS: 1 days | Discharge: HOME | End: 2020-10-30

## 2020-10-30 DIAGNOSIS — Z90.79 ACQUIRED ABSENCE OF OTHER GENITAL ORGAN(S): Chronic | ICD-10-CM

## 2020-10-30 DIAGNOSIS — K76.9 LIVER DISEASE, UNSPECIFIED: ICD-10-CM

## 2020-10-30 DIAGNOSIS — Z90.49 ACQUIRED ABSENCE OF OTHER SPECIFIED PARTS OF DIGESTIVE TRACT: Chronic | ICD-10-CM

## 2020-11-06 ENCOUNTER — APPOINTMENT (OUTPATIENT)
Age: 75
End: 2020-11-06

## 2020-11-08 DIAGNOSIS — C7A.8 OTHER MALIGNANT NEUROENDOCRINE TUMORS: ICD-10-CM

## 2020-11-18 NOTE — HISTORY OF PRESENT ILLNESS
[Disease: _____________________] : Disease: [unfilled] [AJCC Stage: ____] : AJCC Stage: [unfilled] [Home] : at home, [unfilled] , at the time of the visit. 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 Pt initially had Q 6 mos MRI a/p, then annual scans. Previous scan in June 2018 showed stable mesenteric mass and then patient delayed his follow up scan till Nov 2019. MRI Abd/Pelvis showed new bilobar liver lesions suspicious for hepatic mets, two new lesions at T12 and L3 concerning for bone mets. Patient lives in [place] and saw Dr. Saenz, medical oncology, who recommended a liver biopsy but wanted a second opinion. 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, mesenteric and R inguinal LN, multiple anterior abdominal wall mets, \par 1/17/20: Liver bx: well diff NET, Ki 30-70% (upon re-review)\par 2/14/20: Lanreotide monthly started \par 5/15/20: CT Chest: clear lungs\par 6/5/20: MRI A/P: Interval increase in size of bilobar hepatic lesions \par 7/25/20: MRI A/P: interval incr in bilobar liver mets, rest of disease essentially unchanged, ?incr in size of T12 and femoral head mets\par 7/27/20: SIRT \par 8/24/20: MRI Abd: stable disease, improvement in liver mets\par 9/18/20: CT Chest : unchanged \par 10/11/20: MRI A/P: R hepatic lobe decrease in size, otherwise stable disease [de-identified] : well differentiated NET, Ki67 30-70% [de-identified] : 2012 Mesenteric mass bx: well differentiated NET, Ki <3%, mitotic rate low [de-identified] : c/o L LBP that started this morning and was severe enough for pt to take Oxycodone. Oxycodone relieved the pain completely. \par Appetite is good, weight is stable.

## 2020-11-18 NOTE — PHYSICAL EXAM
[Fully active, able to carry on all pre-disease performance without restriction] : Status 0 - Fully active, able to carry on all pre-disease performance without restriction [Normal] : affect appropriate [de-identified] : normal respiratory pattern

## 2020-11-30 ENCOUNTER — OUTPATIENT (OUTPATIENT)
Dept: OUTPATIENT SERVICES | Facility: HOSPITAL | Age: 75
LOS: 1 days | Discharge: HOME | End: 2020-11-30

## 2020-11-30 DIAGNOSIS — K76.9 LIVER DISEASE, UNSPECIFIED: ICD-10-CM

## 2020-11-30 DIAGNOSIS — Z90.79 ACQUIRED ABSENCE OF OTHER GENITAL ORGAN(S): Chronic | ICD-10-CM

## 2020-11-30 DIAGNOSIS — Z90.49 ACQUIRED ABSENCE OF OTHER SPECIFIED PARTS OF DIGESTIVE TRACT: Chronic | ICD-10-CM

## 2020-12-04 ENCOUNTER — RESULT REVIEW (OUTPATIENT)
Age: 75
End: 2020-12-04

## 2020-12-04 ENCOUNTER — APPOINTMENT (OUTPATIENT)
Age: 75
End: 2020-12-04

## 2020-12-04 ENCOUNTER — APPOINTMENT (OUTPATIENT)
Age: 75
End: 2020-12-04
Payer: COMMERCIAL

## 2020-12-04 VITALS
DIASTOLIC BLOOD PRESSURE: 72 MMHG | HEIGHT: 69.29 IN | HEART RATE: 79 BPM | BODY MASS INDEX: 27.73 KG/M2 | RESPIRATION RATE: 15 BRPM | WEIGHT: 189.37 LBS | OXYGEN SATURATION: 97 % | SYSTOLIC BLOOD PRESSURE: 162 MMHG | TEMPERATURE: 98 F

## 2020-12-04 LAB
ALBUMIN SERPL ELPH-MCNC: 3.9 G/DL
ALBUMIN SERPL ELPH-MCNC: 3.9 G/DL
ALP BLD-CCNC: 418 U/L
ALT SERPL-CCNC: 23 U/L
ANION GAP SERPL CALC-SCNC: 11 MMOL/L
AST SERPL-CCNC: 30 U/L
BASOPHILS # BLD AUTO: 0.05 K/UL — SIGNIFICANT CHANGE UP (ref 0–0.2)
BASOPHILS NFR BLD AUTO: 0.8 % — SIGNIFICANT CHANGE UP (ref 0–2)
BILIRUB DIRECT SERPL-MCNC: 0.2 MG/DL
BILIRUB INDIRECT SERPL-MCNC: 0.2 MG/DL
BILIRUB SERPL-MCNC: 0.4 MG/DL
BUN SERPL-MCNC: 34 MG/DL
CALCIUM SERPL-MCNC: 9.2 MG/DL
CHLORIDE SERPL-SCNC: 103 MMOL/L
CO2 SERPL-SCNC: 24 MMOL/L
CREAT SERPL-MCNC: 2.31 MG/DL
EOSINOPHIL # BLD AUTO: 0.08 K/UL — SIGNIFICANT CHANGE UP (ref 0–0.5)
EOSINOPHIL NFR BLD AUTO: 1.2 % — SIGNIFICANT CHANGE UP (ref 0–6)
GLUCOSE SERPL-MCNC: 109 MG/DL
HCT VFR BLD CALC: 31.7 % — LOW (ref 39–50)
HGB BLD-MCNC: 10 G/DL — LOW (ref 13–17)
IMM GRANULOCYTES NFR BLD AUTO: 0.3 % — SIGNIFICANT CHANGE UP (ref 0–1.5)
LYMPHOCYTES # BLD AUTO: 0.4 K/UL — LOW (ref 1–3.3)
LYMPHOCYTES # BLD AUTO: 6.2 % — LOW (ref 13–44)
MCHC RBC-ENTMCNC: 28.7 PG — SIGNIFICANT CHANGE UP (ref 27–34)
MCHC RBC-ENTMCNC: 31.5 G/DL — LOW (ref 32–36)
MCV RBC AUTO: 90.8 FL — SIGNIFICANT CHANGE UP (ref 80–100)
MONOCYTES # BLD AUTO: 0.65 K/UL — SIGNIFICANT CHANGE UP (ref 0–0.9)
MONOCYTES NFR BLD AUTO: 10.1 % — SIGNIFICANT CHANGE UP (ref 2–14)
NEUTROPHILS # BLD AUTO: 5.23 K/UL — SIGNIFICANT CHANGE UP (ref 1.8–7.4)
NEUTROPHILS NFR BLD AUTO: 81.4 % — HIGH (ref 43–77)
NRBC # BLD: 0 /100 WBCS — SIGNIFICANT CHANGE UP (ref 0–0)
PHOSPHATE SERPL-MCNC: 3.4 MG/DL
PLATELET # BLD AUTO: 149 K/UL — LOW (ref 150–400)
POTASSIUM SERPL-SCNC: 4 MMOL/L
PROT SERPL-MCNC: 6.5 G/DL
RBC # BLD: 3.49 M/UL — LOW (ref 4.2–5.8)
RBC # FLD: 15.4 % — HIGH (ref 10.3–14.5)
SODIUM SERPL-SCNC: 139 MMOL/L
WBC # BLD: 6.43 K/UL — SIGNIFICANT CHANGE UP (ref 3.8–10.5)
WBC # FLD AUTO: 6.43 K/UL — SIGNIFICANT CHANGE UP (ref 3.8–10.5)

## 2020-12-04 PROCEDURE — 99214 OFFICE O/P EST MOD 30 MIN: CPT

## 2020-12-04 PROCEDURE — 99072 ADDL SUPL MATRL&STAF TM PHE: CPT

## 2020-12-05 LAB
CREAT SPEC-SCNC: 100 MG/DL
CREAT/PROT UR: 3.2 RATIO
PROT UR-MCNC: 322 MG/DL

## 2020-12-05 NOTE — PHYSICAL EXAM
[Fully active, able to carry on all pre-disease performance without restriction] : Status 0 - Fully active, able to carry on all pre-disease performance without restriction [Normal] : affect appropriate [de-identified] : swelling below the R eye

## 2020-12-05 NOTE — HISTORY OF PRESENT ILLNESS
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 Pt initially had Q 6 mos MRI a/p, then annual scans. Previous scan in June 2018 showed stable mesenteric mass and then patient delayed his follow up scan till Nov 2019. MRI Abd/Pelvis showed new bilobar liver lesions suspicious for hepatic mets, two new lesions at T12 and L3 concerning for bone mets. Patient lives in [place] and saw Dr. Saenz, medical oncology, who recommended a liver biopsy but wanted a second opinion. 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, mesenteric and R inguinal LN, multiple anterior abdominal wall mets, \par 1/17/20: Liver bx: well diff NET, Ki 30-70% (upon re-review)\par 2/14/20: Lanreotide monthly started \par 5/15/20: CT Chest: clear lungs\par 6/5/20: MRI A/P: Interval increase in size of bilobar hepatic lesions \par 7/25/20: MRI A/P: interval incr in bilobar liver mets, rest of disease essentially unchanged, ?incr in size of T12 and femoral head mets\par 7/27/20: SIRT \par 8/24/20: MRI Abd: stable disease, improvement in liver mets\par 9/9/20: SIRT #2\par 9/18/20: CT Chest : unchanged \par 10/11/20: MRI A/P: R hepatic lobe decrease in size, otherwise stable disease [de-identified] : well differentiated NET, Ki67 30-70% [de-identified] : 2012 Mesenteric mass bx: well differentiated NET, Ki <3%, mitotic rate low [FreeTextEntry1] : s/p Y90 x 2 [de-identified] : here for f/u. overall doing well. some knee pain and swelling below the R eye. No change in vision, no pain. \par Appetite is good. Energy is stable. Denies any abdominal pain. \par LE edema b/l, noted it over the past couple of weeks. Worse in the evening, improved in am. No calf tenderness. Denies any SOB/CP

## 2020-12-06 DIAGNOSIS — C7A.8 OTHER MALIGNANT NEUROENDOCRINE TUMORS: ICD-10-CM

## 2020-12-07 LAB
APPEARANCE: CLEAR
BILIRUBIN URINE: NEGATIVE
BLOOD URINE: NEGATIVE
COLOR: YELLOW
GLUCOSE QUALITATIVE U: NEGATIVE
KETONES URINE: NEGATIVE
LEUKOCYTE ESTERASE URINE: NEGATIVE
NITRITE URINE: NEGATIVE
PH URINE: 5.5
PROTEIN URINE: ABNORMAL
SPECIFIC GRAVITY URINE: 1.02
UROBILINOGEN URINE: NORMAL

## 2020-12-08 ENCOUNTER — APPOINTMENT (OUTPATIENT)
Dept: NEPHROLOGY | Facility: CLINIC | Age: 75
End: 2020-12-08
Payer: COMMERCIAL

## 2020-12-08 DIAGNOSIS — R60.0 LOCALIZED EDEMA: ICD-10-CM

## 2020-12-08 LAB — CGA SERPL-MCNC: 4505 NG/ML

## 2020-12-08 PROCEDURE — 99443: CPT

## 2020-12-08 RX ORDER — METHYLPREDNISOLONE 4 MG/1
4 TABLET ORAL
Qty: 1 | Refills: 0 | Status: DISCONTINUED | COMMUNITY
Start: 2020-08-28 | End: 2020-12-08

## 2020-12-08 RX ORDER — EPLERENONE 25 MG/1
25 TABLET, COATED ORAL
Refills: 0 | Status: DISCONTINUED | COMMUNITY
End: 2020-12-08

## 2020-12-08 RX ORDER — PANTOPRAZOLE 40 MG/1
40 TABLET, DELAYED RELEASE ORAL
Qty: 30 | Refills: 2 | Status: DISCONTINUED | COMMUNITY
Start: 2020-07-27 | End: 2020-12-08

## 2020-12-10 PROBLEM — R60.0 EDEMA OF LOWER EXTREMITY: Status: ACTIVE | Noted: 2020-12-08

## 2020-12-10 NOTE — ASSESSMENT
[FreeTextEntry1] : 76 yo male with HTN, proteinuria and CKD4, hyperkalemia, gout, stage IV NET\par CKD4 in setting of HTN: Stable renal function \par Hyperkalemia: Improved.  Keep off ACE for now. Stay on lokelma three times a week. May need to go back on ARB\par HTN: slightly higher than goal. He does have edema. \par Edema: from octreotide? Restart lasix 40mg. Continue hydralazine 50mg BID along with other meds\par Gout : no recent flares\par Proteinuria: need bp control and may need to restart ARB/ or ACE\par Anemia: trend\par Neuroendocrine tumor s/p liver radiofreq emobol- Followup with Dr Boothe and Dr Mcgovern for further management\par Follow up in 2 months

## 2020-12-10 NOTE — REVIEW OF SYSTEMS
[Recent Weight Gain (___ Lbs)] : recent [unfilled] ~Ulb weight gain [Lower Ext Edema] : lower extremity edema [Negative] : Heme/Lymph [FreeTextEntry3] : periorbital edema

## 2020-12-10 NOTE — HISTORY OF PRESENT ILLNESS
[Home] : at home, [unfilled] , at the time of the visit. [Medical Office: ([facility])___] : at the medical office located in  [FreeTextEntry1] : 76 yo [place] sor[place] male on telephone visit for followup of CKD-4, hyperkalemia, proteinuria and hypertension. He had seen Dr Mcgovern and had more edema around eyes and legs\par He is s/p radiofreq embol of liver lesions tolerated well\par Following Dr Mcgovern and Dr Boothe. Was on octreotide\par Renal function was stable\par BP stable at home 150 systolic\par Taking lokelma three times a week and K stable

## 2021-01-11 ENCOUNTER — OUTPATIENT (OUTPATIENT)
Dept: OUTPATIENT SERVICES | Facility: HOSPITAL | Age: 76
LOS: 1 days | Discharge: HOME | End: 2021-01-11

## 2021-01-11 DIAGNOSIS — Z90.49 ACQUIRED ABSENCE OF OTHER SPECIFIED PARTS OF DIGESTIVE TRACT: Chronic | ICD-10-CM

## 2021-01-11 DIAGNOSIS — Z90.79 ACQUIRED ABSENCE OF OTHER GENITAL ORGAN(S): Chronic | ICD-10-CM

## 2021-01-11 DIAGNOSIS — K76.9 LIVER DISEASE, UNSPECIFIED: ICD-10-CM

## 2021-01-15 ENCOUNTER — APPOINTMENT (OUTPATIENT)
Dept: CT IMAGING | Facility: IMAGING CENTER | Age: 76
End: 2021-01-15

## 2021-01-15 ENCOUNTER — APPOINTMENT (OUTPATIENT)
Dept: MRI IMAGING | Facility: IMAGING CENTER | Age: 76
End: 2021-01-15
Payer: COMMERCIAL

## 2021-01-15 ENCOUNTER — APPOINTMENT (OUTPATIENT)
Age: 76
End: 2021-01-15

## 2021-01-15 ENCOUNTER — RESULT REVIEW (OUTPATIENT)
Age: 76
End: 2021-01-15

## 2021-01-15 ENCOUNTER — OUTPATIENT (OUTPATIENT)
Dept: OUTPATIENT SERVICES | Facility: HOSPITAL | Age: 76
LOS: 1 days | End: 2021-01-15
Payer: COMMERCIAL

## 2021-01-15 DIAGNOSIS — Z90.49 ACQUIRED ABSENCE OF OTHER SPECIFIED PARTS OF DIGESTIVE TRACT: Chronic | ICD-10-CM

## 2021-01-15 DIAGNOSIS — Z90.79 ACQUIRED ABSENCE OF OTHER GENITAL ORGAN(S): Chronic | ICD-10-CM

## 2021-01-15 DIAGNOSIS — D3A.8 OTHER BENIGN NEUROENDOCRINE TUMORS: ICD-10-CM

## 2021-01-15 DIAGNOSIS — Z00.8 ENCOUNTER FOR OTHER GENERAL EXAMINATION: ICD-10-CM

## 2021-01-15 LAB
BASOPHILS # BLD AUTO: 0.05 K/UL — SIGNIFICANT CHANGE UP (ref 0–0.2)
BASOPHILS NFR BLD AUTO: 0.8 % — SIGNIFICANT CHANGE UP (ref 0–2)
EOSINOPHIL # BLD AUTO: 0.21 K/UL — SIGNIFICANT CHANGE UP (ref 0–0.5)
EOSINOPHIL NFR BLD AUTO: 3.4 % — SIGNIFICANT CHANGE UP (ref 0–6)
HCT VFR BLD CALC: 30 % — LOW (ref 39–50)
HGB BLD-MCNC: 9.7 G/DL — LOW (ref 13–17)
IMM GRANULOCYTES NFR BLD AUTO: 0.7 % — SIGNIFICANT CHANGE UP (ref 0–1.5)
LYMPHOCYTES # BLD AUTO: 0.44 K/UL — LOW (ref 1–3.3)
LYMPHOCYTES # BLD AUTO: 7.2 % — LOW (ref 13–44)
MCHC RBC-ENTMCNC: 29.2 PG — SIGNIFICANT CHANGE UP (ref 27–34)
MCHC RBC-ENTMCNC: 32.3 G/DL — SIGNIFICANT CHANGE UP (ref 32–36)
MCV RBC AUTO: 90.4 FL — SIGNIFICANT CHANGE UP (ref 80–100)
MONOCYTES # BLD AUTO: 0.71 K/UL — SIGNIFICANT CHANGE UP (ref 0–0.9)
MONOCYTES NFR BLD AUTO: 11.6 % — SIGNIFICANT CHANGE UP (ref 2–14)
NEUTROPHILS # BLD AUTO: 4.67 K/UL — SIGNIFICANT CHANGE UP (ref 1.8–7.4)
NEUTROPHILS NFR BLD AUTO: 76.3 % — SIGNIFICANT CHANGE UP (ref 43–77)
NRBC # BLD: 0 /100 WBCS — SIGNIFICANT CHANGE UP (ref 0–0)
PLATELET # BLD AUTO: 162 K/UL — SIGNIFICANT CHANGE UP (ref 150–400)
RBC # BLD: 3.32 M/UL — LOW (ref 4.2–5.8)
RBC # FLD: 15.3 % — HIGH (ref 10.3–14.5)
WBC # BLD: 6.12 K/UL — SIGNIFICANT CHANGE UP (ref 3.8–10.5)
WBC # FLD AUTO: 6.12 K/UL — SIGNIFICANT CHANGE UP (ref 3.8–10.5)

## 2021-01-15 PROCEDURE — 71250 CT THORAX DX C-: CPT

## 2021-01-15 PROCEDURE — 72197 MRI PELVIS W/O & W/DYE: CPT

## 2021-01-15 PROCEDURE — A9585: CPT

## 2021-01-15 PROCEDURE — 74183 MRI ABD W/O CNTR FLWD CNTR: CPT

## 2021-01-15 PROCEDURE — 72197 MRI PELVIS W/O & W/DYE: CPT | Mod: 26

## 2021-01-15 PROCEDURE — 71250 CT THORAX DX C-: CPT | Mod: 26

## 2021-01-15 PROCEDURE — 74183 MRI ABD W/O CNTR FLWD CNTR: CPT | Mod: 26

## 2021-01-16 LAB
ALBUMIN SERPL ELPH-MCNC: 3.8 G/DL
ALP BLD-CCNC: 344 U/L
ALT SERPL-CCNC: 36 U/L
ANION GAP SERPL CALC-SCNC: 13 MMOL/L
AST SERPL-CCNC: 39 U/L
BILIRUB SERPL-MCNC: 0.4 MG/DL
BUN SERPL-MCNC: 42 MG/DL
CALCIUM SERPL-MCNC: 9.2 MG/DL
CHLORIDE SERPL-SCNC: 104 MMOL/L
CO2 SERPL-SCNC: 23 MMOL/L
CREAT SERPL-MCNC: 2.84 MG/DL
GLUCOSE SERPL-MCNC: 115 MG/DL
POTASSIUM SERPL-SCNC: 4.4 MMOL/L
PROT SERPL-MCNC: 6.6 G/DL
SODIUM SERPL-SCNC: 141 MMOL/L

## 2021-01-19 ENCOUNTER — RX RENEWAL (OUTPATIENT)
Age: 76
End: 2021-01-19

## 2021-01-19 LAB — CGA SERPL-MCNC: 5526 NG/ML

## 2021-01-20 ENCOUNTER — APPOINTMENT (OUTPATIENT)
Age: 76
End: 2021-01-20
Payer: COMMERCIAL

## 2021-01-20 DIAGNOSIS — N18.3 CHRONIC KIDNEY DISEASE, STAGE 3 (MODERATE): ICD-10-CM

## 2021-01-20 PROCEDURE — 99213 OFFICE O/P EST LOW 20 MIN: CPT | Mod: 95

## 2021-01-21 ENCOUNTER — APPOINTMENT (OUTPATIENT)
Dept: INTERVENTIONAL RADIOLOGY/VASCULAR | Facility: CLINIC | Age: 76
End: 2021-01-21
Payer: COMMERCIAL

## 2021-01-21 DIAGNOSIS — K76.9 LIVER DISEASE, UNSPECIFIED: ICD-10-CM

## 2021-01-21 PROCEDURE — 99214 OFFICE O/P EST MOD 30 MIN: CPT | Mod: 95

## 2021-01-21 NOTE — REVIEW OF SYSTEMS
[Fever] : no fever [Chills] : no chills [Loss Of Hearing] : no hearing loss [Eyesight Problems] : no eyesight problems [Chest Pain] : no chest pain [Palpitations] : no palpitations [Shortness Of Breath] : no shortness of breath [Cough] : no cough [Easy Bleeding] : no tendency for easy bleeding [Easy Bruising] : no tendency for easy bruising

## 2021-01-21 NOTE — HISTORY OF PRESENT ILLNESS
[Home] : at home, [unfilled] , at the time of the visit. [Medical Office: ([facility])___] : at the medical office located in  [Verbal consent obtained from patient] : the patient, [unfilled] [FreeTextEntry1] : 75 years old male with hx of HTN, BPH, CKD, neuroendocrine tumor of unknown primary since 2012 that has been followed by surg onc and never received any systemic therapy for. Pt initially had Q 6 mos MRI a/p, then annual scans. Previous scan in June 2018 showed stable mesenteric mass and then patient delayed his follow up scan till Nov 2019. MRI Abd/Pelvis showed new bilobar liver lesions suspicious for hepatic mets, two new lesions at T12 and L3 concerning for bone mets. Patient lives in [place] and saw Dr. Saenz, medical oncology, who recommended a liver biopsy but wanted a second opinion. \par \par Patient had PET/DOTATATE on 01/15/2020 which demonstrated, " multiple hypermetabolic hepatic and osseous met, somatostatin pancreatic tail NET and probably small bowel NET, mesenteric and R inguinal LN, multiple anterior abdominal wall mets". 1/17/20: Liver bx: well diff NET, Ki 30-70% (upon re-review)\par 2/14/20: Lanreotide monthly started. 5/15/20: CT Chest: clear lungs\par On 6/5/20 MRI A/P: Interval increase in size of bilobar hepatic lesions. \par \par Patient is being referred by Dr. Mcgovern for consultation to discuss liver directed treatment options. \par \par Patient now s/p SIRT on 07/27/2020. Patient states she has been feeling well overall. Appetite has been good no unintentional weight loss. Patient states he has no facial flushing or diarrhea. \par \par \par INTERVAL HISTORY\par Patient is now s/p 1st SIRt 7/27/20,  2nd SIRT Right lobe on 09/10/2020. Patient sates he has been feeling well overall since procedure. Appetite has been good no unintentional weight loss. \par \par Denies any recent SOB, CP, fever, chills, n/v/d. \par \par pt has history CKD and he is  is aware to follow up with Dr. Herman.\par \par On Octreotide monthly. \par pt had follow up MRI done and telehealth consult done to discuss MRI results. \par \par \par \par Chromogranin \par 12/30/19      3107\par 07/27/2020  67889 (pre SIRT)\par 8/24/2020   8992 (post SIRT)\par 09/10/2020  8933  (pre 2nd SIRT)\par 10/05/2020  5932 (post 2nd SIRT)\par \par

## 2021-01-21 NOTE — CONSULT LETTER
[Dear  ___] : Dear  [unfilled], [Courtesy Letter:] : I had the pleasure of seeing your patient, [unfilled], in my office today. [Please see my note below.] : Please see my note below. [Consult Closing:] : Thank you very much for allowing me to participate in the care of this patient.  If you have any questions, please do not hesitate to contact me. [Sincerely,] : Sincerely, [FreeTextEntry2] : Dr. Jen Mcgovern

## 2021-01-21 NOTE — ASSESSMENT
[FreeTextEntry1] : S/P SIRT on 7/27/20 and 9/10/20. \par \par Patient is feeling well. F/U MRI: stable disease. TBili 0.4, Alk Phos 344. BUN 42, Creatinine 2.84.\par \par Assessment: Stable liver mets. Renal insufficiency stable.\par \par Plan: continue conservative management. F/U MRI of abdomen with IV contrast in 3 months. \par MRI results and clinical management discussed with Dr. Mcgovern and patient.

## 2021-02-11 ENCOUNTER — OUTPATIENT (OUTPATIENT)
Dept: OUTPATIENT SERVICES | Facility: HOSPITAL | Age: 76
LOS: 1 days | Discharge: HOME | End: 2021-02-11

## 2021-02-11 DIAGNOSIS — K76.9 LIVER DISEASE, UNSPECIFIED: ICD-10-CM

## 2021-02-11 DIAGNOSIS — Z90.79 ACQUIRED ABSENCE OF OTHER GENITAL ORGAN(S): Chronic | ICD-10-CM

## 2021-02-11 DIAGNOSIS — Z90.49 ACQUIRED ABSENCE OF OTHER SPECIFIED PARTS OF DIGESTIVE TRACT: Chronic | ICD-10-CM

## 2021-02-16 ENCOUNTER — APPOINTMENT (OUTPATIENT)
Age: 76
End: 2021-02-16

## 2021-02-16 DIAGNOSIS — C7A.8 OTHER MALIGNANT NEUROENDOCRINE TUMORS: ICD-10-CM

## 2021-02-19 NOTE — PHYSICAL EXAM
[Fully active, able to carry on all pre-disease performance without restriction] : Status 0 - Fully active, able to carry on all pre-disease performance without restriction [Normal] : affect appropriate [de-identified] : normal respiratory pattern

## 2021-02-19 NOTE — HISTORY OF PRESENT ILLNESS
[Home] : at home, [unfilled] , at the time of the visit. 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 Pt initially had Q 6 mos MRI a/p, then annual scans. Previous scan in June 2018 showed stable mesenteric mass and then patient delayed his follow up scan till Nov 2019. MRI Abd/Pelvis showed new bilobar liver lesions suspicious for hepatic mets, two new lesions at T12 and L3 concerning for bone mets. Patient lives in [place] and saw Dr. Saenz, medical oncology, who recommended a liver biopsy but wanted a second opinion. 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, mesenteric and R inguinal LN, multiple anterior abdominal wall mets, \par 1/17/20: Liver bx: well diff NET, Ki 30-70% (upon re-review)\par 2/14/20: Lanreotide monthly started \par 5/15/20: CT Chest: clear lungs\par 6/5/20: MRI A/P: Interval increase in size of bilobar hepatic lesions \par 7/25/20: MRI A/P: interval incr in bilobar liver mets, rest of disease essentially unchanged, ?incr in size of T12 and femoral head mets\par 7/27/20: SIRT \par 8/24/20: MRI Abd: stable disease, improvement in liver mets\par 9/9/20: SIRT #2\par 9/18/20: CT Chest : unchanged \par 10/11/20: MRI A/P: R hepatic lobe decrease in size, otherwise stable disease\par 1/15/21: CT Chest, MR A/P: stable disease [de-identified] : 2012 Mesenteric mass bx: well differentiated NET, Ki <3%, mitotic rate low [de-identified] : well differentiated NET, Ki67 30-70% [FreeTextEntry1] : s/p Y90 x 2 [de-identified] : overall doing well. Symptoms post SIRT resolved. Now appetite has improved. Energy level also better. denies any pain.

## 2021-03-12 ENCOUNTER — OUTPATIENT (OUTPATIENT)
Dept: OUTPATIENT SERVICES | Facility: HOSPITAL | Age: 76
LOS: 1 days | Discharge: HOME | End: 2021-03-12

## 2021-03-12 DIAGNOSIS — Z90.79 ACQUIRED ABSENCE OF OTHER GENITAL ORGAN(S): Chronic | ICD-10-CM

## 2021-03-12 DIAGNOSIS — Z90.49 ACQUIRED ABSENCE OF OTHER SPECIFIED PARTS OF DIGESTIVE TRACT: Chronic | ICD-10-CM

## 2021-03-12 DIAGNOSIS — K76.9 LIVER DISEASE, UNSPECIFIED: ICD-10-CM

## 2021-03-17 ENCOUNTER — APPOINTMENT (OUTPATIENT)
Age: 76
End: 2021-03-17

## 2021-03-25 DIAGNOSIS — C7A.8 OTHER MALIGNANT NEUROENDOCRINE TUMORS: ICD-10-CM

## 2021-04-02 ENCOUNTER — APPOINTMENT (OUTPATIENT)
Dept: NEPHROLOGY | Facility: CLINIC | Age: 76
End: 2021-04-02
Payer: COMMERCIAL

## 2021-04-02 VITALS
BODY MASS INDEX: 26.36 KG/M2 | HEART RATE: 64 BPM | WEIGHT: 179.99 LBS | SYSTOLIC BLOOD PRESSURE: 136 MMHG | DIASTOLIC BLOOD PRESSURE: 60 MMHG

## 2021-04-02 PROCEDURE — 99215 OFFICE O/P EST HI 40 MIN: CPT | Mod: 25

## 2021-04-02 PROCEDURE — 93000 ELECTROCARDIOGRAM COMPLETE: CPT

## 2021-04-02 PROCEDURE — 99072 ADDL SUPL MATRL&STAF TM PHE: CPT

## 2021-04-02 NOTE — REASON FOR VISIT
[Acute] : an acute visit [Spouse] : spouse [FreeTextEntry1] : syncopal episode yesterday, hypertension, CKD4

## 2021-04-02 NOTE — ASSESSMENT
[FreeTextEntry1] : 75 yo male with HTN, proteinuria and CKD4, hyperkalemia, gout, stage IV NET, recent syncope yesterday.\par CKD4 in setting of HTN: Check renal function \par Hyperkalemia: Improved.  Keep off ACE for now. Go back on lokelma three times a week. Hold julissa blocker until we get lab results\par EKG done shows sinus sloane but no peaked Ts\par HTN: better. He does have edema. Continue lasix 40mg. Continue hydralazine 50mg BID along with other meds\par Gout : no recent flares\par Proteinuria: trend\par Anemia: trend\par Neuroendocrine tumor s/p liver radiofreq emobol- Followup with Dr Boothe and Dr Mcgovern for further management\par Syncope:  vasovagal vs other cause. Seeing cardiology next week- for cardiac eval for syncope\par Follow up in 2 months

## 2021-04-02 NOTE — PHYSICAL EXAM
[General Appearance - Alert] : alert [General Appearance - In No Acute Distress] : in no acute distress [Sclera] : the sclera and conjunctiva were normal [Outer Ear] : the ears and nose were normal in appearance [Neck Appearance] : the appearance of the neck was normal [Auscultation Breath Sounds / Voice Sounds] : lungs were clear to auscultation bilaterally [Heart Rate And Rhythm] : heart rate was normal and rhythm regular [Heart Sounds] : normal S1 and S2 [Heart Sounds Pericardial Friction Rub] : no pericardial rub [Systolic grade ___/6] : A grade [unfilled]/6 systolic murmur was heard. [FreeTextEntry1] : 1-2+ edema [Bowel Sounds] : normal bowel sounds [Abdomen Soft] : soft [No CVA Tenderness] : no ~M costovertebral angle tenderness [] : no rash [No Focal Deficits] : no focal deficits [Oriented To Time, Place, And Person] : oriented to person, place, and time [Affect] : the affect was normal

## 2021-04-02 NOTE — HISTORY OF PRESENT ILLNESS
[FreeTextEntry1] : 77 yo Mendoza Donaldopalomo  here on an unannounced acute visit with his wife.\par He reports yesterday was at  and was talking on the phone when he syncopized. Reportedly for a minute. \par They called the EMS but he refused to go and got his wife to pick him up. \par Did not eat or drink most of the day yesterday. \par Denies CP or SOB or any symptoms. \par He was back on epleronone but stopped the lokelma\par He had embol of his liver mets in July and Sept. \par Complains of left hand intermittent numbness. He does have salty foods at times

## 2021-04-07 LAB
25(OH)D3 SERPL-MCNC: 22.1 NG/ML
ALBUMIN SERPL ELPH-MCNC: 4.1 G/DL
ALP BLD-CCNC: 324 U/L
ALT SERPL-CCNC: 25 U/L
ANION GAP SERPL CALC-SCNC: 12 MMOL/L
APPEARANCE: CLEAR
AST SERPL-CCNC: 27 U/L
BACTERIA: NEGATIVE
BASOPHILS # BLD AUTO: 0.04 K/UL
BASOPHILS NFR BLD AUTO: 0.7 %
BILIRUB SERPL-MCNC: 0.4 MG/DL
BILIRUBIN URINE: NEGATIVE
BLOOD URINE: NEGATIVE
BUN SERPL-MCNC: 48 MG/DL
CALCIUM SERPL-MCNC: 9.4 MG/DL
CALCIUM SERPL-MCNC: 9.4 MG/DL
CHLORIDE SERPL-SCNC: 104 MMOL/L
CO2 SERPL-SCNC: 25 MMOL/L
COLOR: NORMAL
CREAT SERPL-MCNC: 3.28 MG/DL
CREAT SPEC-SCNC: 102 MG/DL
EOSINOPHIL # BLD AUTO: 0.12 K/UL
EOSINOPHIL NFR BLD AUTO: 2.1 %
ESTIMATED AVERAGE GLUCOSE: 128 MG/DL
GLUCOSE QUALITATIVE U: NEGATIVE
GLUCOSE SERPL-MCNC: 114 MG/DL
HBA1C MFR BLD HPLC: 6.1 %
HCT VFR BLD CALC: 31.9 %
HGB BLD-MCNC: 9.9 G/DL
HYALINE CASTS: 2 /LPF
IMM GRANULOCYTES NFR BLD AUTO: 0.2 %
KETONES URINE: NEGATIVE
LEUKOCYTE ESTERASE URINE: NEGATIVE
LYMPHOCYTES # BLD AUTO: 0.59 K/UL
LYMPHOCYTES NFR BLD AUTO: 10.2 %
MAGNESIUM SERPL-MCNC: 2.5 MG/DL
MAN DIFF?: NORMAL
MCHC RBC-ENTMCNC: 28.8 PG
MCHC RBC-ENTMCNC: 31 GM/DL
MCV RBC AUTO: 92.7 FL
MICROALBUMIN 24H UR DL<=1MG/L-MCNC: 97.9 MG/DL
MICROALBUMIN/CREAT 24H UR-RTO: 955 MG/G
MICROSCOPIC-UA: NORMAL
MONOCYTES # BLD AUTO: 0.52 K/UL
MONOCYTES NFR BLD AUTO: 9 %
NEUTROPHILS # BLD AUTO: 4.51 K/UL
NEUTROPHILS NFR BLD AUTO: 77.8 %
NITRITE URINE: NEGATIVE
PARATHYROID HORMONE INTACT: 156 PG/ML
PH URINE: 6
PHOSPHATE SERPL-MCNC: 4 MG/DL
PLATELET # BLD AUTO: 149 K/UL
POTASSIUM SERPL-SCNC: 4.6 MMOL/L
PROT SERPL-MCNC: 6.8 G/DL
PROTEIN URINE: ABNORMAL
RBC # BLD: 3.44 M/UL
RBC # FLD: 16.1 %
RED BLOOD CELLS URINE: 3 /HPF
SODIUM SERPL-SCNC: 140 MMOL/L
SPECIFIC GRAVITY URINE: 1.01
SQUAMOUS EPITHELIAL CELLS: 0 /HPF
URATE SERPL-MCNC: 9.6 MG/DL
UROBILINOGEN URINE: NORMAL
WBC # FLD AUTO: 5.79 K/UL
WHITE BLOOD CELLS URINE: 1 /HPF

## 2021-04-08 ENCOUNTER — APPOINTMENT (OUTPATIENT)
Dept: CARDIOLOGY | Facility: CLINIC | Age: 76
End: 2021-04-08
Payer: COMMERCIAL

## 2021-04-08 ENCOUNTER — NON-APPOINTMENT (OUTPATIENT)
Age: 76
End: 2021-04-08

## 2021-04-08 VITALS
DIASTOLIC BLOOD PRESSURE: 60 MMHG | HEART RATE: 80 BPM | BODY MASS INDEX: 27.09 KG/M2 | SYSTOLIC BLOOD PRESSURE: 132 MMHG | OXYGEN SATURATION: 97 % | TEMPERATURE: 98 F | WEIGHT: 185 LBS

## 2021-04-08 VITALS — DIASTOLIC BLOOD PRESSURE: 60 MMHG | SYSTOLIC BLOOD PRESSURE: 110 MMHG

## 2021-04-08 DIAGNOSIS — R55 SYNCOPE AND COLLAPSE: ICD-10-CM

## 2021-04-08 PROCEDURE — 99072 ADDL SUPL MATRL&STAF TM PHE: CPT

## 2021-04-08 PROCEDURE — 93242 EXT ECG>48HR<7D RECORDING: CPT

## 2021-04-08 PROCEDURE — 93000 ELECTROCARDIOGRAM COMPLETE: CPT | Mod: 59

## 2021-04-08 PROCEDURE — 99204 OFFICE O/P NEW MOD 45 MIN: CPT

## 2021-04-08 NOTE — REVIEW OF SYSTEMS
[see HPI] : see HPI [Shortness Of Breath] : no shortness of breath [Chest Pain] : no chest pain [Palpitations] : no palpitations [Negative] : Heme/Lymph

## 2021-04-08 NOTE — PHYSICAL EXAM
[General Appearance - Well Developed] : well developed [General Appearance - Well Nourished] : well nourished [General Appearance - In No Acute Distress] : no acute distress [Normal Conjunctiva] : the conjunctiva exhibited no abnormalities [No Oral Pallor] : no oral pallor [Normal Jugular Venous V Waves Present] : normal jugular venous V waves present [Heart Rate And Rhythm] : heart rate and rhythm were normal [Heart Sounds] : normal S1 and S2 [Arterial Pulses Normal] : the arterial pulses were normal [Edema] : no peripheral edema present [FreeTextEntry1] : There is a 1/6 systolic ejection murmur at the right upper sternal border. [Respiration, Rhythm And Depth] : normal respiratory rhythm and effort [Auscultation Breath Sounds / Voice Sounds] : lungs were clear to auscultation bilaterally [Bowel Sounds] : normal bowel sounds [Abdomen Soft] : soft [Abnormal Walk] : normal gait [Cyanosis, Localized] : no localized cyanosis [Skin Turgor] : normal skin turgor [Oriented To Time, Place, And Person] : oriented to person, place, and time [Impaired Insight] : insight and judgment were intact [Affect] : the affect was normal

## 2021-04-08 NOTE — DISCUSSION/SUMMARY
[FreeTextEntry1] : He is a 76-year-old with long-standing chronic hypertension, and chronic renal insufficiency with hyperkalemia who had an episode of syncope.  Examination was unremarkable and there are no significant abnormalities or murmurs.\par Orthostatic hypotensive syncope is the most likely cause of this syndrome and I have educated him to keep himself well-hydrated and to immediately lay down if he should feel the same premonitory symptoms.\par As his blood pressure is on the lower side and I encouraged him to keep himself well-hydrated.  We could consider cutting back his amlodipine given his ankle edema as well.\par I have outfitted him with a 3-day cardiac monitor in order to exclude any transient arrhythmias as his last echo did show moderate left atrial enlargement.\par Echocardiography will be obtained in order to exclude any structural issues or pericardial disease that may have contributed to his syncope.\par

## 2021-04-08 NOTE — HISTORY OF PRESENT ILLNESS
[FreeTextEntry1] : Dear Tisha;\par \par Thank you for referring him back for cardiovascular evaluation.  He is a 76-year-old with chronic renal insufficiency and hypertension who was in his usual state of health when he had a episode of transient loss of consciousness.  On April 1 he reported standing for over an hour at a 's office and then suddenly feeling lightheaded dizzy and while trying to walk across the rest the room he found himself slumped slumped in the arms of some someone else and was unable to continue a phone conversation.  He slowly recovered and has not had any episodes since since.\par He has no prior history of syncope.\par He reports that he does not drink much fluid on a regular day, maybe a cup of coffee and a drink in the morning, but that is it.\par He does no routine aerobic exercise and continues to work from home as an  for [employer].\par He has no history of coronary disease, diabetes mellitus, smoking or family history of premature coronary artery disease.\par He is currently being monitored for neuroendocrine tumor tumor.

## 2021-04-13 ENCOUNTER — RESULT REVIEW (OUTPATIENT)
Age: 76
End: 2021-04-13

## 2021-04-15 ENCOUNTER — APPOINTMENT (OUTPATIENT)
Age: 76
End: 2021-04-15

## 2021-04-22 ENCOUNTER — RX RENEWAL (OUTPATIENT)
Age: 76
End: 2021-04-22

## 2021-05-05 ENCOUNTER — NON-APPOINTMENT (OUTPATIENT)
Age: 76
End: 2021-05-05

## 2021-05-05 ENCOUNTER — RX RENEWAL (OUTPATIENT)
Age: 76
End: 2021-05-05

## 2021-05-05 DIAGNOSIS — I47.2 VENTRICULAR TACHYCARDIA: ICD-10-CM

## 2021-05-06 ENCOUNTER — OUTPATIENT (OUTPATIENT)
Dept: OUTPATIENT SERVICES | Facility: HOSPITAL | Age: 76
LOS: 1 days | End: 2021-05-06
Payer: COMMERCIAL

## 2021-05-06 ENCOUNTER — APPOINTMENT (OUTPATIENT)
Dept: CT IMAGING | Facility: IMAGING CENTER | Age: 76
End: 2021-05-06
Payer: COMMERCIAL

## 2021-05-06 ENCOUNTER — APPOINTMENT (OUTPATIENT)
Dept: CARDIOLOGY | Facility: CLINIC | Age: 76
End: 2021-05-06

## 2021-05-06 ENCOUNTER — APPOINTMENT (OUTPATIENT)
Dept: MRI IMAGING | Facility: IMAGING CENTER | Age: 76
End: 2021-05-06
Payer: COMMERCIAL

## 2021-05-06 DIAGNOSIS — Z90.79 ACQUIRED ABSENCE OF OTHER GENITAL ORGAN(S): Chronic | ICD-10-CM

## 2021-05-06 DIAGNOSIS — Z90.49 ACQUIRED ABSENCE OF OTHER SPECIFIED PARTS OF DIGESTIVE TRACT: Chronic | ICD-10-CM

## 2021-05-06 DIAGNOSIS — Z00.8 ENCOUNTER FOR OTHER GENERAL EXAMINATION: ICD-10-CM

## 2021-05-06 DIAGNOSIS — D3A.8 OTHER BENIGN NEUROENDOCRINE TUMORS: ICD-10-CM

## 2021-05-06 PROCEDURE — 72197 MRI PELVIS W/O & W/DYE: CPT

## 2021-05-06 PROCEDURE — 72197 MRI PELVIS W/O & W/DYE: CPT | Mod: 26

## 2021-05-06 PROCEDURE — 71250 CT THORAX DX C-: CPT | Mod: 26

## 2021-05-06 PROCEDURE — 74183 MRI ABD W/O CNTR FLWD CNTR: CPT | Mod: 26

## 2021-05-06 PROCEDURE — A9585: CPT

## 2021-05-06 PROCEDURE — 74183 MRI ABD W/O CNTR FLWD CNTR: CPT

## 2021-05-06 PROCEDURE — 71250 CT THORAX DX C-: CPT

## 2021-05-11 ENCOUNTER — OUTPATIENT (OUTPATIENT)
Dept: OUTPATIENT SERVICES | Facility: HOSPITAL | Age: 76
LOS: 1 days | Discharge: HOME | End: 2021-05-11

## 2021-05-11 DIAGNOSIS — Z90.49 ACQUIRED ABSENCE OF OTHER SPECIFIED PARTS OF DIGESTIVE TRACT: Chronic | ICD-10-CM

## 2021-05-11 DIAGNOSIS — Z90.79 ACQUIRED ABSENCE OF OTHER GENITAL ORGAN(S): Chronic | ICD-10-CM

## 2021-05-11 DIAGNOSIS — K76.9 LIVER DISEASE, UNSPECIFIED: ICD-10-CM

## 2021-05-12 ENCOUNTER — APPOINTMENT (OUTPATIENT)
Dept: HEMATOLOGY ONCOLOGY | Facility: CLINIC | Age: 76
End: 2021-05-12
Payer: COMMERCIAL

## 2021-05-12 ENCOUNTER — APPOINTMENT (OUTPATIENT)
Dept: INFUSION THERAPY | Facility: HOSPITAL | Age: 76
End: 2021-05-12

## 2021-05-12 DIAGNOSIS — D64.9 ANEMIA, UNSPECIFIED: ICD-10-CM

## 2021-05-12 DIAGNOSIS — C7A.8 OTHER MALIGNANT NEUROENDOCRINE TUMORS: ICD-10-CM

## 2021-05-12 PROCEDURE — 99442: CPT

## 2021-05-12 NOTE — HISTORY OF PRESENT ILLNESS
[Home] : at home, [unfilled] , at the time of the visit. 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 Pt initially had Q 6 mos MRI a/p, then annual scans. Previous scan in June 2018 showed stable mesenteric mass and then patient delayed his follow up scan till Nov 2019. MRI Abd/Pelvis showed new bilobar liver lesions suspicious for hepatic mets, two new lesions at T12 and L3 concerning for bone mets. Patient lives in [place] and saw Dr. Saenz, medical oncology, who recommended a liver biopsy but wanted a second opinion. 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, mesenteric and R inguinal LN, multiple anterior abdominal wall mets, \par 1/17/20: Liver bx: well diff NET, Ki 30-70% (upon re-review)\par 2/14/20: Lanreotide monthly started \par 5/15/20: CT Chest: clear lungs\par 6/5/20: MRI A/P: Interval increase in size of bilobar hepatic lesions \par 7/25/20: MRI A/P: interval incr in bilobar liver mets, rest of disease essentially unchanged, ?incr in size of T12 and femoral head mets\par 7/27/20: SIRT \par 8/24/20: MRI Abd: stable disease, improvement in liver mets\par 9/9/20: SIRT #2\par 9/18/20: CT Chest : unchanged \par 10/11/20: MRI A/P: R hepatic lobe decrease in size, otherwise stable disease\par 1/15/21: CT Chest, MR A/P: stable disease\par 5/6/21: CT Chest, MR A/P: stable disease, mesenteric mass slightly increased in size  [de-identified] : well differentiated NET, Ki67 30-70% [de-identified] : 2012 Mesenteric mass bx: well differentiated NET, Ki <3%, mitotic rate low [FreeTextEntry1] : s/p Y90 x 2 [de-identified] : had a presyncopal event 2 weeks ago, is not sure what happened, was awake during the episode. Saw cards and had a holter monitor which showed NSVT and SVT, pending stress test. \par otherwise good appetite, denies any pain. weight is stable.

## 2021-05-21 ENCOUNTER — APPOINTMENT (OUTPATIENT)
Dept: CARDIOLOGY | Facility: CLINIC | Age: 76
End: 2021-05-21
Payer: COMMERCIAL

## 2021-05-21 PROCEDURE — 99072 ADDL SUPL MATRL&STAF TM PHE: CPT

## 2021-05-21 PROCEDURE — 93306 TTE W/DOPPLER COMPLETE: CPT

## 2021-05-26 ENCOUNTER — APPOINTMENT (OUTPATIENT)
Dept: CARDIOLOGY | Facility: CLINIC | Age: 76
End: 2021-05-26

## 2021-06-07 ENCOUNTER — APPOINTMENT (OUTPATIENT)
Dept: INFUSION THERAPY | Facility: HOSPITAL | Age: 76
End: 2021-06-07

## 2021-06-09 ENCOUNTER — APPOINTMENT (OUTPATIENT)
Dept: INFUSION THERAPY | Facility: HOSPITAL | Age: 76
End: 2021-06-09

## 2021-06-17 ENCOUNTER — APPOINTMENT (OUTPATIENT)
Dept: CARDIOLOGY | Facility: CLINIC | Age: 76
End: 2021-06-17

## 2021-06-18 ENCOUNTER — APPOINTMENT (OUTPATIENT)
Dept: OPHTHALMOLOGY | Facility: CLINIC | Age: 76
End: 2021-06-18
Payer: COMMERCIAL

## 2021-06-18 ENCOUNTER — NON-APPOINTMENT (OUTPATIENT)
Age: 76
End: 2021-06-18

## 2021-06-18 PROCEDURE — 99072 ADDL SUPL MATRL&STAF TM PHE: CPT

## 2021-06-18 PROCEDURE — 92014 COMPRE OPH EXAM EST PT 1/>: CPT

## 2021-06-24 ENCOUNTER — APPOINTMENT (OUTPATIENT)
Dept: CARDIOLOGY | Facility: CLINIC | Age: 76
End: 2021-06-24

## 2021-06-29 ENCOUNTER — LABORATORY RESULT (OUTPATIENT)
Age: 76
End: 2021-06-29

## 2021-06-29 DIAGNOSIS — R80.9 PROTEINURIA, UNSPECIFIED: ICD-10-CM

## 2021-06-30 ENCOUNTER — RESULT REVIEW (OUTPATIENT)
Age: 76
End: 2021-06-30

## 2021-06-30 ENCOUNTER — APPOINTMENT (OUTPATIENT)
Dept: HEMATOLOGY ONCOLOGY | Facility: CLINIC | Age: 76
End: 2021-06-30

## 2021-06-30 ENCOUNTER — OUTPATIENT (OUTPATIENT)
Dept: OUTPATIENT SERVICES | Facility: HOSPITAL | Age: 76
LOS: 1 days | Discharge: HOME | End: 2021-06-30

## 2021-06-30 DIAGNOSIS — K76.9 LIVER DISEASE, UNSPECIFIED: ICD-10-CM

## 2021-06-30 DIAGNOSIS — Z90.49 ACQUIRED ABSENCE OF OTHER SPECIFIED PARTS OF DIGESTIVE TRACT: Chronic | ICD-10-CM

## 2021-06-30 DIAGNOSIS — Z90.79 ACQUIRED ABSENCE OF OTHER GENITAL ORGAN(S): Chronic | ICD-10-CM

## 2021-06-30 LAB
BASOPHILS # BLD AUTO: 0 K/UL — SIGNIFICANT CHANGE UP (ref 0–0.2)
BASOPHILS NFR BLD AUTO: 0 % — SIGNIFICANT CHANGE UP (ref 0–2)
EOSINOPHIL # BLD AUTO: 0 K/UL — SIGNIFICANT CHANGE UP (ref 0–0.5)
EOSINOPHIL NFR BLD AUTO: 0 % — SIGNIFICANT CHANGE UP (ref 0–6)
HCT VFR BLD CALC: 30.5 % — LOW (ref 39–50)
HGB BLD-MCNC: 10.2 G/DL — LOW (ref 13–17)
LYMPHOCYTES # BLD AUTO: 1.65 K/UL — SIGNIFICANT CHANGE UP (ref 1–3.3)
LYMPHOCYTES # BLD AUTO: 25 % — SIGNIFICANT CHANGE UP (ref 13–44)
MCHC RBC-ENTMCNC: 29.3 PG — SIGNIFICANT CHANGE UP (ref 27–34)
MCHC RBC-ENTMCNC: 33.4 G/DL — SIGNIFICANT CHANGE UP (ref 32–36)
MCV RBC AUTO: 87.6 FL — SIGNIFICANT CHANGE UP (ref 80–100)
MONOCYTES # BLD AUTO: 0.46 K/UL — SIGNIFICANT CHANGE UP (ref 0–0.9)
MONOCYTES NFR BLD AUTO: 7 % — SIGNIFICANT CHANGE UP (ref 2–14)
NEUTROPHILS # BLD AUTO: 4.49 K/UL — SIGNIFICANT CHANGE UP (ref 1.8–7.4)
NEUTROPHILS NFR BLD AUTO: 68 % — SIGNIFICANT CHANGE UP (ref 43–77)
NRBC # BLD: 0 /100 — SIGNIFICANT CHANGE UP (ref 0–0)
NRBC # BLD: SIGNIFICANT CHANGE UP /100 WBCS (ref 0–0)
PLAT MORPH BLD: NORMAL — SIGNIFICANT CHANGE UP
PLATELET # BLD AUTO: 118 K/UL — LOW (ref 150–400)
RBC # BLD: 3.48 M/UL — LOW (ref 4.2–5.8)
RBC # FLD: 14.3 % — SIGNIFICANT CHANGE UP (ref 10.3–14.5)
RBC BLD AUTO: SIGNIFICANT CHANGE UP
WBC # BLD: 6.6 K/UL — SIGNIFICANT CHANGE UP (ref 3.8–10.5)
WBC # FLD AUTO: 6.6 K/UL — SIGNIFICANT CHANGE UP (ref 3.8–10.5)

## 2021-07-01 ENCOUNTER — APPOINTMENT (OUTPATIENT)
Dept: NEPHROLOGY | Facility: CLINIC | Age: 76
End: 2021-07-01
Payer: COMMERCIAL

## 2021-07-01 DIAGNOSIS — E87.5 HYPERKALEMIA: ICD-10-CM

## 2021-07-01 LAB
25(OH)D3 SERPL-MCNC: 27.3 NG/ML
ALBUMIN SERPL ELPH-MCNC: 3.4 G/DL
ALP BLD-CCNC: 339 U/L
ALT SERPL-CCNC: 37 U/L
ANION GAP SERPL CALC-SCNC: 16 MMOL/L
APPEARANCE: CLEAR
AST SERPL-CCNC: 53 U/L
BACTERIA: NEGATIVE
BASOPHILS # BLD AUTO: 0.04 K/UL
BASOPHILS NFR BLD AUTO: 0.6 %
BILIRUB SERPL-MCNC: 0.5 MG/DL
BILIRUBIN URINE: NEGATIVE
BLOOD URINE: NEGATIVE
BUN SERPL-MCNC: 58 MG/DL
CALCIUM SERPL-MCNC: 8.1 MG/DL
CALCIUM SERPL-MCNC: 8.1 MG/DL
CHLORIDE SERPL-SCNC: 95 MMOL/L
CHOLEST SERPL-MCNC: 141 MG/DL
CO2 SERPL-SCNC: 23 MMOL/L
COLOR: YELLOW
CREAT SERPL-MCNC: 3.63 MG/DL
CREAT SPEC-SCNC: 215 MG/DL
EOSINOPHIL # BLD AUTO: 0.01 K/UL
EOSINOPHIL NFR BLD AUTO: 0.1 %
ESTIMATED AVERAGE GLUCOSE: 126 MG/DL
FERRITIN SERPL-MCNC: 840 NG/ML
GLUCOSE QUALITATIVE U: NEGATIVE
GLUCOSE SERPL-MCNC: 150 MG/DL
HBA1C MFR BLD HPLC: 6 %
HBV CORE IGG+IGM SER QL: NONREACTIVE
HBV SURFACE AB SER QL: NONREACTIVE
HBV SURFACE AG SER QL: NONREACTIVE
HCT VFR BLD CALC: 30.9 %
HCV AB SER QL: NONREACTIVE
HCV S/CO RATIO: 0.14 S/CO
HDLC SERPL-MCNC: 24 MG/DL
HGB BLD-MCNC: 10.2 G/DL
HYALINE CASTS: 2 /LPF
IMM GRANULOCYTES NFR BLD AUTO: 0.6 %
IRON SATN MFR SERPL: 23 %
IRON SERPL-MCNC: 47 UG/DL
KETONES URINE: NEGATIVE
LDLC SERPL CALC-MCNC: 69 MG/DL
LEUKOCYTE ESTERASE URINE: NEGATIVE
LYMPHOCYTES # BLD AUTO: 2.32 K/UL
LYMPHOCYTES NFR BLD AUTO: 33.4 %
MAGNESIUM SERPL-MCNC: 2.5 MG/DL
MAN DIFF?: NORMAL
MCHC RBC-ENTMCNC: 28.8 PG
MCHC RBC-ENTMCNC: 33 GM/DL
MCV RBC AUTO: 87.3 FL
MICROALBUMIN 24H UR DL<=1MG/L-MCNC: 92.2 MG/DL
MICROALBUMIN/CREAT 24H UR-RTO: 430 MG/G
MICROSCOPIC-UA: NORMAL
MONOCYTES # BLD AUTO: 0.35 K/UL
MONOCYTES NFR BLD AUTO: 5 %
NEUTROPHILS # BLD AUTO: 4.18 K/UL
NEUTROPHILS NFR BLD AUTO: 60.3 %
NITRITE URINE: NEGATIVE
NONHDLC SERPL-MCNC: 117 MG/DL
PARATHYROID HORMONE INTACT: 155 PG/ML
PH URINE: 6
PHOSPHATE SERPL-MCNC: 3.2 MG/DL
PLATELET # BLD AUTO: 111 K/UL
POTASSIUM SERPL-SCNC: 3.5 MMOL/L
PROT SERPL-MCNC: 5.9 G/DL
PROTEIN URINE: ABNORMAL
RBC # BLD: 3.54 M/UL
RBC # FLD: 14.3 %
RED BLOOD CELLS URINE: 1 /HPF
SODIUM SERPL-SCNC: 134 MMOL/L
SPECIFIC GRAVITY URINE: 1.02
SQUAMOUS EPITHELIAL CELLS: 1 /HPF
TIBC SERPL-MCNC: 200 UG/DL
TRIGL SERPL-MCNC: 240 MG/DL
UIBC SERPL-MCNC: 153 UG/DL
URATE SERPL-MCNC: 10.7 MG/DL
URINE COMMENTS: NORMAL
UROBILINOGEN URINE: NORMAL
WBC # FLD AUTO: 6.94 K/UL
WHITE BLOOD CELLS URINE: 2 /HPF

## 2021-07-01 PROCEDURE — 99215 OFFICE O/P EST HI 40 MIN: CPT | Mod: 95

## 2021-07-01 NOTE — HISTORY OF PRESENT ILLNESS
[Home] : at home, [unfilled] , at the time of the visit. [Medical Office: ([facility])___] : at the medical office located in  [Spouse] : spouse [Verbal consent obtained from patient] : the patient, [unfilled] [FreeTextEntry1] : 77 yo [facility]  on a telehealth visit with his wife present.\par Since my visit with him in April, he had seen Dr. Lisker current follow-up, had a echocardiogram done, and was due to get a stress test.  Stress test postponed because of a left eye redness with conjunctival changes and went to ophthalmologist.  He rescheduled his stress test.\par Over the weekend had temperature however was fell asleep in the sun for hours.  His temperature eventually subsided over the weekend.  He had an episode of low blood pressure in the 90s however instead of laying down he went and took a hot shower and felt dizzy lightheaded and almost passed out.\par Blood pressure today 127/57, pulse 86.  Weight is 180 pounds.\par Overall feels better.  Has been taking his blood pressures.\par Has also been taking Lokelma daily after skipping it for a few days.

## 2021-07-01 NOTE — ASSESSMENT
[FreeTextEntry1] : 77 yo male with HTN, proteinuria and CKD4, hyperkalemia, gout, stage IV NET\par CKD4 in setting of HTN: Creatinine slightly higher than his baseline.  Likely from hemodynamic changes and elective hypotension.  Repeat in 1 month time.\par Hyperkalemia: Improved.  Take Lokelma every other day.\par HTN controlled and too low.  Decrease doxazosin to 4 mg twice a day.  Continue amlodipine 10 mg, furosemide 40 mg and hydralazine 50 mg twice a day.  Patient advised not to stay in the heat or take hot showers when blood pressure is low.  Rather patient was advised to take rest and hold antihypertensive medications.\par Gout : no recent flares\par Proteinuria: Overall improved\par Anemia: Stable\par Neuroendocrine tumor s/p liver radiofreq emobol- Followup with Dr Boothe and Dr Mcgovern for further management\par Patient to go for stress test.\par Labs in 1 month time.  Follow-up in 3 months.

## 2021-07-02 LAB
M TB IFN-G BLD-IMP: NEGATIVE
QUANTIFERON TB PLUS MITOGEN MINUS NIL: 2.23 IU/ML
QUANTIFERON TB PLUS NIL: 0.05 IU/ML
QUANTIFERON TB PLUS TB1 MINUS NIL: 0 IU/ML
QUANTIFERON TB PLUS TB2 MINUS NIL: 0 IU/ML

## 2021-07-07 ENCOUNTER — RESULT REVIEW (OUTPATIENT)
Age: 76
End: 2021-07-07

## 2021-07-07 ENCOUNTER — APPOINTMENT (OUTPATIENT)
Dept: INFUSION THERAPY | Facility: HOSPITAL | Age: 76
End: 2021-07-07

## 2021-07-07 ENCOUNTER — APPOINTMENT (OUTPATIENT)
Dept: HEMATOLOGY ONCOLOGY | Facility: CLINIC | Age: 76
End: 2021-07-07
Payer: COMMERCIAL

## 2021-07-07 VITALS
OXYGEN SATURATION: 97 % | DIASTOLIC BLOOD PRESSURE: 68 MMHG | SYSTOLIC BLOOD PRESSURE: 118 MMHG | TEMPERATURE: 98.2 F | RESPIRATION RATE: 16 BRPM | HEART RATE: 84 BPM | WEIGHT: 177.47 LBS | BODY MASS INDEX: 26.59 KG/M2 | HEIGHT: 68.5 IN

## 2021-07-07 DIAGNOSIS — R50.9 FEVER, UNSPECIFIED: ICD-10-CM

## 2021-07-07 LAB
BASOPHILS # BLD AUTO: 0 K/UL — SIGNIFICANT CHANGE UP (ref 0–0.2)
BASOPHILS NFR BLD AUTO: 0 % — SIGNIFICANT CHANGE UP (ref 0–2)
EOSINOPHIL # BLD AUTO: 0 K/UL — SIGNIFICANT CHANGE UP (ref 0–0.5)
EOSINOPHIL NFR BLD AUTO: 0 % — SIGNIFICANT CHANGE UP (ref 0–6)
HCT VFR BLD CALC: 32 % — LOW (ref 39–50)
HGB BLD-MCNC: 10.5 G/DL — LOW (ref 13–17)
LYMPHOCYTES # BLD AUTO: 1.01 K/UL — SIGNIFICANT CHANGE UP (ref 1–3.3)
LYMPHOCYTES # BLD AUTO: 17 % — SIGNIFICANT CHANGE UP (ref 13–44)
MCHC RBC-ENTMCNC: 28.8 PG — SIGNIFICANT CHANGE UP (ref 27–34)
MCHC RBC-ENTMCNC: 32.8 G/DL — SIGNIFICANT CHANGE UP (ref 32–36)
MCV RBC AUTO: 87.9 FL — SIGNIFICANT CHANGE UP (ref 80–100)
MONOCYTES # BLD AUTO: 0.48 K/UL — SIGNIFICANT CHANGE UP (ref 0–0.9)
MONOCYTES NFR BLD AUTO: 8 % — SIGNIFICANT CHANGE UP (ref 2–14)
NEUTROPHILS # BLD AUTO: 4.46 K/UL — SIGNIFICANT CHANGE UP (ref 1.8–7.4)
NEUTROPHILS NFR BLD AUTO: 75 % — SIGNIFICANT CHANGE UP (ref 43–77)
NRBC # BLD: 0 /100 — SIGNIFICANT CHANGE UP (ref 0–0)
NRBC # BLD: SIGNIFICANT CHANGE UP /100 WBCS (ref 0–0)
PLAT MORPH BLD: NORMAL — SIGNIFICANT CHANGE UP
PLATELET # BLD AUTO: 220 K/UL — SIGNIFICANT CHANGE UP (ref 150–400)
RBC # BLD: 3.64 M/UL — LOW (ref 4.2–5.8)
RBC # FLD: 14.3 % — SIGNIFICANT CHANGE UP (ref 10.3–14.5)
RBC BLD AUTO: SIGNIFICANT CHANGE UP
WBC # BLD: 5.95 K/UL — SIGNIFICANT CHANGE UP (ref 3.8–10.5)
WBC # FLD AUTO: 5.95 K/UL — SIGNIFICANT CHANGE UP (ref 3.8–10.5)

## 2021-07-07 PROCEDURE — 99214 OFFICE O/P EST MOD 30 MIN: CPT

## 2021-07-07 PROCEDURE — 99072 ADDL SUPL MATRL&STAF TM PHE: CPT

## 2021-07-08 LAB
RAPID RVP RESULT: NOT DETECTED
SARS-COV-2 RNA PNL RESP NAA+PROBE: NOT DETECTED

## 2021-07-09 LAB — BACTERIA UR CULT: NORMAL

## 2021-07-11 PROBLEM — R50.9 FEVER: Status: ACTIVE | Noted: 2021-07-07

## 2021-07-11 NOTE — HISTORY OF PRESENT ILLNESS
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 Pt initially had Q 6 mos MRI a/p, then annual scans. Previous scan in June 2018 showed stable mesenteric mass and then patient delayed his follow up scan till Nov 2019. MRI Abd/Pelvis showed new bilobar liver lesions suspicious for hepatic mets, two new lesions at T12 and L3 concerning for bone mets. Patient lives in [place] and saw Dr. Saenz, medical oncology, who recommended a liver biopsy but wanted a second opinion. 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, mesenteric and R inguinal LN, multiple anterior abdominal wall mets, \par 1/17/20: Liver bx: well diff NET, Ki 30-70% (upon re-review)\par 2/14/20: Lanreotide monthly started \par 5/15/20: CT Chest: clear lungs\par 6/5/20: MRI A/P: Interval increase in size of bilobar hepatic lesions \par 7/25/20: MRI A/P: interval incr in bilobar liver mets, rest of disease essentially unchanged, ?incr in size of T12 and femoral head mets\par 7/27/20: SIRT \par 8/24/20: MRI Abd: stable disease, improvement in liver mets\par 9/9/20: SIRT #2\par 9/18/20: CT Chest : unchanged \par 10/11/20: MRI A/P: R hepatic lobe decrease in size, otherwise stable disease\par 1/15/21: CT Chest, MR A/P: stable disease\par 5/6/21: CT Chest, MR A/P: stable disease, mesenteric mass slightly increased in size  [de-identified] : well differentiated NET, Ki67 30-70% [de-identified] : 2012 Mesenteric mass bx: well differentiated NET, Ki <3%, mitotic rate low [FreeTextEntry1] : s/p Y90 x 2 [de-identified] : episode of fever 10 days ago to 104, resolved with Tylenol. DId not reoccur. Pt has not been feeling well since. Poor appetite, lost 8 lbs since April. Food does not taste good. Denies any pain, dysuria. + fatigue. Sleeping more. Scans were scheduled for Aug 1. Saw renal recently, Cr cont to rise.

## 2021-07-11 NOTE — REVIEW OF SYSTEMS
[Chills] : chills [Fatigue] : fatigue [Recent Change In Weight] : ~T recent weight change [Negative] : Allergic/Immunologic

## 2021-07-13 ENCOUNTER — RESULT REVIEW (OUTPATIENT)
Age: 76
End: 2021-07-13

## 2021-07-13 LAB — BACTERIA BLD CULT: NORMAL

## 2021-07-14 ENCOUNTER — APPOINTMENT (OUTPATIENT)
Dept: MRI IMAGING | Facility: IMAGING CENTER | Age: 76
End: 2021-07-14
Payer: COMMERCIAL

## 2021-07-14 ENCOUNTER — OUTPATIENT (OUTPATIENT)
Dept: OUTPATIENT SERVICES | Facility: HOSPITAL | Age: 76
LOS: 1 days | End: 2021-07-14
Payer: COMMERCIAL

## 2021-07-14 ENCOUNTER — APPOINTMENT (OUTPATIENT)
Dept: CT IMAGING | Facility: IMAGING CENTER | Age: 76
End: 2021-07-14
Payer: COMMERCIAL

## 2021-07-14 DIAGNOSIS — D3A.8 OTHER BENIGN NEUROENDOCRINE TUMORS: ICD-10-CM

## 2021-07-14 DIAGNOSIS — Z90.49 ACQUIRED ABSENCE OF OTHER SPECIFIED PARTS OF DIGESTIVE TRACT: Chronic | ICD-10-CM

## 2021-07-14 DIAGNOSIS — Z00.8 ENCOUNTER FOR OTHER GENERAL EXAMINATION: ICD-10-CM

## 2021-07-14 DIAGNOSIS — Z90.79 ACQUIRED ABSENCE OF OTHER GENITAL ORGAN(S): Chronic | ICD-10-CM

## 2021-07-14 PROCEDURE — 72197 MRI PELVIS W/O & W/DYE: CPT

## 2021-07-14 PROCEDURE — 71250 CT THORAX DX C-: CPT | Mod: 26

## 2021-07-14 PROCEDURE — 74183 MRI ABD W/O CNTR FLWD CNTR: CPT

## 2021-07-14 PROCEDURE — 74183 MRI ABD W/O CNTR FLWD CNTR: CPT | Mod: 26

## 2021-07-14 PROCEDURE — 72197 MRI PELVIS W/O & W/DYE: CPT | Mod: 26

## 2021-07-14 PROCEDURE — A9585: CPT

## 2021-07-14 PROCEDURE — 71250 CT THORAX DX C-: CPT

## 2021-07-30 ENCOUNTER — OUTPATIENT (OUTPATIENT)
Dept: OUTPATIENT SERVICES | Facility: HOSPITAL | Age: 76
LOS: 1 days | Discharge: HOME | End: 2021-07-30

## 2021-07-30 DIAGNOSIS — K76.9 LIVER DISEASE, UNSPECIFIED: ICD-10-CM

## 2021-07-30 DIAGNOSIS — Z90.79 ACQUIRED ABSENCE OF OTHER GENITAL ORGAN(S): Chronic | ICD-10-CM

## 2021-07-30 DIAGNOSIS — Z90.49 ACQUIRED ABSENCE OF OTHER SPECIFIED PARTS OF DIGESTIVE TRACT: Chronic | ICD-10-CM

## 2021-08-02 ENCOUNTER — APPOINTMENT (OUTPATIENT)
Dept: INFUSION THERAPY | Facility: HOSPITAL | Age: 76
End: 2021-08-02

## 2021-08-03 ENCOUNTER — NON-APPOINTMENT (OUTPATIENT)
Age: 76
End: 2021-08-03

## 2021-08-03 ENCOUNTER — APPOINTMENT (OUTPATIENT)
Dept: HEMATOLOGY ONCOLOGY | Facility: CLINIC | Age: 76
End: 2021-08-03
Payer: COMMERCIAL

## 2021-08-03 PROCEDURE — 99442: CPT

## 2021-08-04 RX ORDER — EVEROLIMUS 5 MG/1
5 TABLET ORAL
Qty: 30 | Refills: 0 | Status: ACTIVE | COMMUNITY
Start: 2021-08-03 | End: 1900-01-01

## 2021-08-05 ENCOUNTER — LABORATORY RESULT (OUTPATIENT)
Age: 76
End: 2021-08-05

## 2021-08-05 ENCOUNTER — RESULT REVIEW (OUTPATIENT)
Age: 76
End: 2021-08-05

## 2021-08-05 ENCOUNTER — APPOINTMENT (OUTPATIENT)
Dept: INFUSION THERAPY | Facility: HOSPITAL | Age: 76
End: 2021-08-05

## 2021-08-05 LAB
BASOPHILS # BLD AUTO: 0.03 K/UL — SIGNIFICANT CHANGE UP (ref 0–0.2)
BASOPHILS NFR BLD AUTO: 0.6 % — SIGNIFICANT CHANGE UP (ref 0–2)
EOSINOPHIL # BLD AUTO: 0.11 K/UL — SIGNIFICANT CHANGE UP (ref 0–0.5)
EOSINOPHIL NFR BLD AUTO: 2.2 % — SIGNIFICANT CHANGE UP (ref 0–6)
HCT VFR BLD CALC: 28 % — LOW (ref 39–50)
HGB BLD-MCNC: 9.2 G/DL — LOW (ref 13–17)
IMM GRANULOCYTES NFR BLD AUTO: 0.4 % — SIGNIFICANT CHANGE UP (ref 0–1.5)
LYMPHOCYTES # BLD AUTO: 0.91 K/UL — LOW (ref 1–3.3)
LYMPHOCYTES # BLD AUTO: 18.5 % — SIGNIFICANT CHANGE UP (ref 13–44)
MCHC RBC-ENTMCNC: 29.6 PG — SIGNIFICANT CHANGE UP (ref 27–34)
MCHC RBC-ENTMCNC: 32.9 G/DL — SIGNIFICANT CHANGE UP (ref 32–36)
MCV RBC AUTO: 90 FL — SIGNIFICANT CHANGE UP (ref 80–100)
MONOCYTES # BLD AUTO: 0.58 K/UL — SIGNIFICANT CHANGE UP (ref 0–0.9)
MONOCYTES NFR BLD AUTO: 11.8 % — SIGNIFICANT CHANGE UP (ref 2–14)
NEUTROPHILS # BLD AUTO: 3.27 K/UL — SIGNIFICANT CHANGE UP (ref 1.8–7.4)
NEUTROPHILS NFR BLD AUTO: 66.5 % — SIGNIFICANT CHANGE UP (ref 43–77)
NRBC # BLD: 0 /100 WBCS — SIGNIFICANT CHANGE UP (ref 0–0)
PLATELET # BLD AUTO: 136 K/UL — LOW (ref 150–400)
RBC # BLD: 3.11 M/UL — LOW (ref 4.2–5.8)
RBC # FLD: 15.7 % — HIGH (ref 10.3–14.5)
WBC # BLD: 4.92 K/UL — SIGNIFICANT CHANGE UP (ref 3.8–10.5)
WBC # FLD AUTO: 4.92 K/UL — SIGNIFICANT CHANGE UP (ref 3.8–10.5)

## 2021-08-06 DIAGNOSIS — C71.8 MALIGNANT NEOPLASM OF OVERLAPPING SITES OF BRAIN: ICD-10-CM

## 2021-08-19 ENCOUNTER — APPOINTMENT (OUTPATIENT)
Dept: HEMATOLOGY ONCOLOGY | Facility: CLINIC | Age: 76
End: 2021-08-19
Payer: COMMERCIAL

## 2021-08-19 PROCEDURE — 99214 OFFICE O/P EST MOD 30 MIN: CPT | Mod: 95

## 2021-08-31 ENCOUNTER — OUTPATIENT (OUTPATIENT)
Dept: OUTPATIENT SERVICES | Facility: HOSPITAL | Age: 76
LOS: 1 days | Discharge: HOME | End: 2021-08-31

## 2021-08-31 DIAGNOSIS — Z90.79 ACQUIRED ABSENCE OF OTHER GENITAL ORGAN(S): Chronic | ICD-10-CM

## 2021-08-31 DIAGNOSIS — C7A.8 OTHER MALIGNANT NEUROENDOCRINE TUMORS: ICD-10-CM

## 2021-08-31 DIAGNOSIS — Z90.49 ACQUIRED ABSENCE OF OTHER SPECIFIED PARTS OF DIGESTIVE TRACT: Chronic | ICD-10-CM

## 2021-09-02 ENCOUNTER — APPOINTMENT (OUTPATIENT)
Dept: INFUSION THERAPY | Facility: HOSPITAL | Age: 76
End: 2021-09-02

## 2021-09-05 NOTE — PHYSICAL EXAM
[Restricted in physically strenuous activity but ambulatory and able to carry out work of a light or sedentary nature] : Status 1- Restricted in physically strenuous activity but ambulatory and able to carry out work of a light or sedentary nature, e.g., light house work, office work [Normal] : affect appropriate [de-identified] : normal repiratory pattern

## 2021-09-05 NOTE — HISTORY OF PRESENT ILLNESS
[Home] : at home, [unfilled] , at the time of the visit. [Other Location: e.g. 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 Pt initially had Q 6 mos MRI a/p, then annual scans. Previous scan in June 2018 showed stable mesenteric mass and then patient delayed his follow up scan till Nov 2019. MRI Abd/Pelvis showed new bilobar liver lesions suspicious for hepatic mets, two new lesions at T12 and L3 concerning for bone mets. Patient lives in [place] and saw Dr. Saenz, medical oncology, who recommended a liver biopsy but wanted a second opinion. 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, mesenteric and R inguinal LN, multiple anterior abdominal wall mets, \par 1/17/20: Liver bx: well diff NET, Ki 30-70% (upon re-review)\par 2/14/20: Lanreotide monthly started \par 5/15/20: CT Chest: clear lungs\par 6/5/20: MRI A/P: Interval increase in size of bilobar hepatic lesions \par 7/25/20: MRI A/P: interval incr in bilobar liver mets, rest of disease essentially unchanged, ?incr in size of T12 and femoral head mets\par 7/27/20: SIRT \par 8/24/20: MRI Abd: stable disease, improvement in liver mets\par 9/9/20: SIRT #2\par 9/18/20: CT Chest : unchanged \par 10/11/20: MRI A/P: R hepatic lobe decrease in size, otherwise stable disease\par 1/15/21: CT Chest, MR A/P: stable disease\par 5/6/21: CT Chest, MR A/P: stable disease, mesenteric mass slightly increased in size \par 7/14/21: CT Chest: stable pulm nodules, new indeterminant subdiaphragmatic LN MRI A/P: slight incr in mesenteric mass, new peritoneal disease with worsening ascites, hepatic disease without change  [de-identified] : well differentiated NET, Ki67 30-70% [de-identified] : 2012 Mesenteric mass bx: well differentiated NET, Ki <3%, mitotic rate low [FreeTextEntry1] : s/p Y90 x 2 [de-identified] : received the Everolimus on Monday but is worried about starting due to AEs. Concerned his ability to tolerate. Has many questions about pros and cons of treatment. He is clinically unchanged. Remains tired at times. Appetite is fair, he feels that it has improved.

## 2021-09-08 ENCOUNTER — RESULT REVIEW (OUTPATIENT)
Age: 76
End: 2021-09-08

## 2021-09-08 ENCOUNTER — APPOINTMENT (OUTPATIENT)
Dept: HEMATOLOGY ONCOLOGY | Facility: CLINIC | Age: 76
End: 2021-09-08
Payer: COMMERCIAL

## 2021-09-08 VITALS
WEIGHT: 193.35 LBS | BODY MASS INDEX: 28.97 KG/M2 | DIASTOLIC BLOOD PRESSURE: 62 MMHG | TEMPERATURE: 97.8 F | SYSTOLIC BLOOD PRESSURE: 152 MMHG | RESPIRATION RATE: 18 BRPM | HEIGHT: 68.5 IN | HEART RATE: 64 BPM | OXYGEN SATURATION: 97 %

## 2021-09-08 DIAGNOSIS — I10 ESSENTIAL (PRIMARY) HYPERTENSION: ICD-10-CM

## 2021-09-08 LAB
ALBUMIN SERPL ELPH-MCNC: 3.4 G/DL
ALP BLD-CCNC: 460 U/L
ALT SERPL-CCNC: 33 U/L
ANION GAP SERPL CALC-SCNC: 13 MMOL/L
APTT BLD: 30.7 SEC
AST SERPL-CCNC: 64 U/L
BASOPHILS # BLD AUTO: 0.01 K/UL — SIGNIFICANT CHANGE UP (ref 0–0.2)
BASOPHILS NFR BLD AUTO: 0.3 % — SIGNIFICANT CHANGE UP (ref 0–2)
BILIRUB SERPL-MCNC: 0.4 MG/DL
BUN SERPL-MCNC: 42 MG/DL
CALCIUM SERPL-MCNC: 8.7 MG/DL
CHLORIDE SERPL-SCNC: 105 MMOL/L
CO2 SERPL-SCNC: 24 MMOL/L
CREAT SERPL-MCNC: 3.27 MG/DL
EOSINOPHIL # BLD AUTO: 0.08 K/UL — SIGNIFICANT CHANGE UP (ref 0–0.5)
EOSINOPHIL NFR BLD AUTO: 2.3 % — SIGNIFICANT CHANGE UP (ref 0–6)
GLUCOSE SERPL-MCNC: 124 MG/DL
HCT VFR BLD CALC: 30.2 % — LOW (ref 39–50)
HGB BLD-MCNC: 9.6 G/DL — LOW (ref 13–17)
IMM GRANULOCYTES NFR BLD AUTO: 0.3 % — SIGNIFICANT CHANGE UP (ref 0–1.5)
INR PPP: 0.96 RATIO
LYMPHOCYTES # BLD AUTO: 0.83 K/UL — LOW (ref 1–3.3)
LYMPHOCYTES # BLD AUTO: 23.6 % — SIGNIFICANT CHANGE UP (ref 13–44)
MCHC RBC-ENTMCNC: 28.5 PG — SIGNIFICANT CHANGE UP (ref 27–34)
MCHC RBC-ENTMCNC: 31.8 G/DL — LOW (ref 32–36)
MCV RBC AUTO: 89.6 FL — SIGNIFICANT CHANGE UP (ref 80–100)
MONOCYTES # BLD AUTO: 0.36 K/UL — SIGNIFICANT CHANGE UP (ref 0–0.9)
MONOCYTES NFR BLD AUTO: 10.3 % — SIGNIFICANT CHANGE UP (ref 2–14)
NEUTROPHILS # BLD AUTO: 2.22 K/UL — SIGNIFICANT CHANGE UP (ref 1.8–7.4)
NEUTROPHILS NFR BLD AUTO: 63.2 % — SIGNIFICANT CHANGE UP (ref 43–77)
NRBC # BLD: 0 /100 WBCS — SIGNIFICANT CHANGE UP (ref 0–0)
PLATELET # BLD AUTO: 82 K/UL — LOW (ref 150–400)
POTASSIUM SERPL-SCNC: 3.8 MMOL/L
PROT SERPL-MCNC: 6.5 G/DL
PT BLD: 11.4 SEC
RBC # BLD: 3.37 M/UL — LOW (ref 4.2–5.8)
RBC # FLD: 14.6 % — HIGH (ref 10.3–14.5)
SODIUM SERPL-SCNC: 142 MMOL/L
WBC # BLD: 3.51 K/UL — LOW (ref 3.8–10.5)
WBC # FLD AUTO: 3.51 K/UL — LOW (ref 3.8–10.5)

## 2021-09-08 PROCEDURE — 99214 OFFICE O/P EST MOD 30 MIN: CPT

## 2021-09-13 ENCOUNTER — APPOINTMENT (OUTPATIENT)
Dept: DISASTER EMERGENCY | Facility: CLINIC | Age: 76
End: 2021-09-13

## 2021-09-15 LAB — SARS-COV-2 N GENE NPH QL NAA+PROBE: NOT DETECTED

## 2021-09-16 ENCOUNTER — RESULT REVIEW (OUTPATIENT)
Age: 76
End: 2021-09-16

## 2021-09-16 ENCOUNTER — OUTPATIENT (OUTPATIENT)
Dept: OUTPATIENT SERVICES | Facility: HOSPITAL | Age: 76
LOS: 1 days | End: 2021-09-16
Payer: COMMERCIAL

## 2021-09-16 ENCOUNTER — APPOINTMENT (OUTPATIENT)
Dept: ULTRASOUND IMAGING | Facility: IMAGING CENTER | Age: 76
End: 2021-09-16
Payer: COMMERCIAL

## 2021-09-16 DIAGNOSIS — Z90.79 ACQUIRED ABSENCE OF OTHER GENITAL ORGAN(S): Chronic | ICD-10-CM

## 2021-09-16 DIAGNOSIS — R18.8 OTHER ASCITES: ICD-10-CM

## 2021-09-16 DIAGNOSIS — Z90.49 ACQUIRED ABSENCE OF OTHER SPECIFIED PARTS OF DIGESTIVE TRACT: Chronic | ICD-10-CM

## 2021-09-16 PROCEDURE — 49083 ABD PARACENTESIS W/IMAGING: CPT

## 2021-09-17 ENCOUNTER — RESULT REVIEW (OUTPATIENT)
Age: 76
End: 2021-09-17

## 2021-09-23 ENCOUNTER — NON-APPOINTMENT (OUTPATIENT)
Age: 76
End: 2021-09-23

## 2021-09-23 ENCOUNTER — APPOINTMENT (OUTPATIENT)
Dept: OPHTHALMOLOGY | Facility: CLINIC | Age: 76
End: 2021-09-23
Payer: COMMERCIAL

## 2021-09-23 PROCEDURE — 92012 INTRM OPH EXAM EST PATIENT: CPT

## 2021-09-25 NOTE — HISTORY OF PRESENT ILLNESS
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 Pt initially had Q 6 mos MRI a/p, then annual scans. Previous scan in June 2018 showed stable mesenteric mass and then patient delayed his follow up scan till Nov 2019. MRI Abd/Pelvis showed new bilobar liver lesions suspicious for hepatic mets, two new lesions at T12 and L3 concerning for bone mets. Patient lives in [place] and saw Dr. Saenz, medical oncology, who recommended a liver biopsy but wanted a second opinion. 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, mesenteric and R inguinal LN, multiple anterior abdominal wall mets, \par 1/17/20: Liver bx: well diff NET, Ki 30-70% (upon re-review)\par 2/14/20: Lanreotide monthly started \par 5/15/20: CT Chest: clear lungs\par 6/5/20: MRI A/P: Interval increase in size of bilobar hepatic lesions \par 7/25/20: MRI A/P: interval incr in bilobar liver mets, rest of disease essentially unchanged, ?incr in size of T12 and femoral head mets\par 7/27/20: SIRT \par 8/24/20: MRI Abd: stable disease, improvement in liver mets\par 9/9/20: SIRT #2\par 9/18/20: CT Chest : unchanged \par 10/11/20: MRI A/P: R hepatic lobe decrease in size, otherwise stable disease\par 1/15/21: CT Chest, MR A/P: stable disease\par 5/6/21: CT Chest, MR A/P: stable disease, mesenteric mass slightly increased in size \par 7/14/21: CT Chest: stable pulm nodules, new indeterminant subdiaphragmatic LN MRI A/P: slight incr in mesenteric mass, new peritoneal disease with worsening ascites, hepatic disease without change \par 8/21/22: Everolimus 5 mg  [de-identified] : well differentiated NET, Ki67 30-70% [de-identified] : 2012 Mesenteric mass bx: well differentiated NET, Ki <3%, mitotic rate low [de-identified] : has gained 16 lbs in 2 mos. Eating better. Reports abdominal distention. \par + fatigue - but continue to work full time. No mucositis. is unhappy with the periorbital edema.

## 2021-09-25 NOTE — PHYSICAL EXAM
[Restricted in physically strenuous activity but ambulatory and able to carry out work of a light or sedentary nature] : Status 1- Restricted in physically strenuous activity but ambulatory and able to carry out work of a light or sedentary nature, e.g., light house work, office work [Normal] : affect appropriate [de-identified] : + abd distention

## 2021-09-30 ENCOUNTER — OUTPATIENT (OUTPATIENT)
Dept: OUTPATIENT SERVICES | Facility: HOSPITAL | Age: 76
LOS: 1 days | Discharge: HOME | End: 2021-09-30

## 2021-09-30 ENCOUNTER — APPOINTMENT (OUTPATIENT)
Dept: INFUSION THERAPY | Facility: HOSPITAL | Age: 76
End: 2021-09-30

## 2021-09-30 DIAGNOSIS — C7A.8 OTHER MALIGNANT NEUROENDOCRINE TUMORS: ICD-10-CM

## 2021-09-30 DIAGNOSIS — Z90.79 ACQUIRED ABSENCE OF OTHER GENITAL ORGAN(S): Chronic | ICD-10-CM

## 2021-09-30 DIAGNOSIS — Z90.49 ACQUIRED ABSENCE OF OTHER SPECIFIED PARTS OF DIGESTIVE TRACT: Chronic | ICD-10-CM

## 2021-10-01 ENCOUNTER — LABORATORY RESULT (OUTPATIENT)
Age: 76
End: 2021-10-01

## 2021-10-01 ENCOUNTER — APPOINTMENT (OUTPATIENT)
Dept: HEMATOLOGY ONCOLOGY | Facility: CLINIC | Age: 76
End: 2021-10-01
Payer: COMMERCIAL

## 2021-10-01 ENCOUNTER — RESULT REVIEW (OUTPATIENT)
Age: 76
End: 2021-10-01

## 2021-10-01 VITALS
HEART RATE: 58 BPM | RESPIRATION RATE: 17 BRPM | BODY MASS INDEX: 28.67 KG/M2 | SYSTOLIC BLOOD PRESSURE: 152 MMHG | HEIGHT: 68.5 IN | OXYGEN SATURATION: 99 % | DIASTOLIC BLOOD PRESSURE: 72 MMHG | TEMPERATURE: 98.1 F | WEIGHT: 191.36 LBS

## 2021-10-01 LAB
BASOPHILS # BLD AUTO: 0.02 K/UL — SIGNIFICANT CHANGE UP (ref 0–0.2)
BASOPHILS NFR BLD AUTO: 0.6 % — SIGNIFICANT CHANGE UP (ref 0–2)
EOSINOPHIL # BLD AUTO: 0.06 K/UL — SIGNIFICANT CHANGE UP (ref 0–0.5)
EOSINOPHIL NFR BLD AUTO: 1.9 % — SIGNIFICANT CHANGE UP (ref 0–6)
HCT VFR BLD CALC: 27.9 % — LOW (ref 39–50)
HGB BLD-MCNC: 9.2 G/DL — LOW (ref 13–17)
IMM GRANULOCYTES NFR BLD AUTO: 0.3 % — SIGNIFICANT CHANGE UP (ref 0–1.5)
LYMPHOCYTES # BLD AUTO: 0.6 K/UL — LOW (ref 1–3.3)
LYMPHOCYTES # BLD AUTO: 19.5 % — SIGNIFICANT CHANGE UP (ref 13–44)
MCHC RBC-ENTMCNC: 28.1 PG — SIGNIFICANT CHANGE UP (ref 27–34)
MCHC RBC-ENTMCNC: 33 G/DL — SIGNIFICANT CHANGE UP (ref 32–36)
MCV RBC AUTO: 85.3 FL — SIGNIFICANT CHANGE UP (ref 80–100)
MONOCYTES # BLD AUTO: 0.35 K/UL — SIGNIFICANT CHANGE UP (ref 0–0.9)
MONOCYTES NFR BLD AUTO: 11.4 % — SIGNIFICANT CHANGE UP (ref 2–14)
NEUTROPHILS # BLD AUTO: 2.04 K/UL — SIGNIFICANT CHANGE UP (ref 1.8–7.4)
NEUTROPHILS NFR BLD AUTO: 66.3 % — SIGNIFICANT CHANGE UP (ref 43–77)
NRBC # BLD: 0 /100 WBCS — SIGNIFICANT CHANGE UP (ref 0–0)
PLATELET # BLD AUTO: 90 K/UL — LOW (ref 150–400)
RBC # BLD: 3.27 M/UL — LOW (ref 4.2–5.8)
RBC # FLD: 13.5 % — SIGNIFICANT CHANGE UP (ref 10.3–14.5)
WBC # BLD: 3.08 K/UL — LOW (ref 3.8–10.5)
WBC # FLD AUTO: 3.08 K/UL — LOW (ref 3.8–10.5)

## 2021-10-01 PROCEDURE — 99215 OFFICE O/P EST HI 40 MIN: CPT

## 2021-10-04 ENCOUNTER — RESULT REVIEW (OUTPATIENT)
Age: 76
End: 2021-10-04

## 2021-10-05 LAB
BASOPHILS # BLD AUTO: 0.02 K/UL
BASOPHILS NFR BLD AUTO: 0.6 %
CGA SERPL-MCNC: ABNORMAL NG/ML
EOSINOPHIL # BLD AUTO: 0.05 K/UL
EOSINOPHIL NFR BLD AUTO: 1.6 %
ESTIMATED AVERAGE GLUCOSE: 131 MG/DL
HBA1C MFR BLD HPLC: 6.2 %
HCT VFR BLD CALC: 28.3 %
HGB BLD-MCNC: 9.3 G/DL
IMM GRANULOCYTES NFR BLD AUTO: 0.3 %
LYMPHOCYTES # BLD AUTO: 0.61 K/UL
LYMPHOCYTES NFR BLD AUTO: 19.2 %
MAN DIFF?: NORMAL
MCHC RBC-ENTMCNC: 27.8 PG
MCHC RBC-ENTMCNC: 32.9 GM/DL
MCV RBC AUTO: 84.7 FL
MONOCYTES # BLD AUTO: 0.34 K/UL
MONOCYTES NFR BLD AUTO: 10.7 %
NEUTROPHILS # BLD AUTO: 2.14 K/UL
NEUTROPHILS NFR BLD AUTO: 67.6 %
PLATELET # BLD AUTO: 96 K/UL
RBC # BLD: 3.34 M/UL
RBC # FLD: 13.5 %
WBC # FLD AUTO: 3.17 K/UL

## 2021-10-06 ENCOUNTER — APPOINTMENT (OUTPATIENT)
Dept: HEMATOLOGY ONCOLOGY | Facility: CLINIC | Age: 76
End: 2021-10-06

## 2021-10-06 LAB
ALBUMIN SERPL ELPH-MCNC: 3.6 G/DL
ALP BLD-CCNC: 443 U/L
ALT SERPL-CCNC: 20 U/L
ANION GAP SERPL CALC-SCNC: 14 MMOL/L
AST SERPL-CCNC: 41 U/L
BILIRUB SERPL-MCNC: 0.4 MG/DL
BUN SERPL-MCNC: 45 MG/DL
CALCIUM SERPL-MCNC: 8.6 MG/DL
CHLORIDE SERPL-SCNC: 99 MMOL/L
CO2 SERPL-SCNC: 25 MMOL/L
CREAT SERPL-MCNC: 3.43 MG/DL
GLUCOSE SERPL-MCNC: 112 MG/DL
POTASSIUM SERPL-SCNC: 3 MMOL/L
PROT SERPL-MCNC: 6.5 G/DL
SODIUM SERPL-SCNC: 139 MMOL/L

## 2021-10-07 ENCOUNTER — RESULT REVIEW (OUTPATIENT)
Age: 76
End: 2021-10-07

## 2021-10-07 ENCOUNTER — APPOINTMENT (OUTPATIENT)
Dept: CT IMAGING | Facility: IMAGING CENTER | Age: 76
End: 2021-10-07
Payer: COMMERCIAL

## 2021-10-07 ENCOUNTER — OUTPATIENT (OUTPATIENT)
Dept: OUTPATIENT SERVICES | Facility: HOSPITAL | Age: 76
LOS: 1 days | End: 2021-10-07
Payer: COMMERCIAL

## 2021-10-07 ENCOUNTER — APPOINTMENT (OUTPATIENT)
Dept: HEMATOLOGY ONCOLOGY | Facility: CLINIC | Age: 76
End: 2021-10-07

## 2021-10-07 DIAGNOSIS — Z90.49 ACQUIRED ABSENCE OF OTHER SPECIFIED PARTS OF DIGESTIVE TRACT: Chronic | ICD-10-CM

## 2021-10-07 DIAGNOSIS — Z90.79 ACQUIRED ABSENCE OF OTHER GENITAL ORGAN(S): Chronic | ICD-10-CM

## 2021-10-07 DIAGNOSIS — D3A.8 OTHER BENIGN NEUROENDOCRINE TUMORS: ICD-10-CM

## 2021-10-07 DIAGNOSIS — Z00.8 ENCOUNTER FOR OTHER GENERAL EXAMINATION: ICD-10-CM

## 2021-10-07 LAB
ALBUMIN SERPL ELPH-MCNC: 3.1 G/DL
ALP BLD-CCNC: 393 U/L
ALT SERPL-CCNC: 24 U/L
ANION GAP SERPL CALC-SCNC: 15 MMOL/L
AST SERPL-CCNC: 47 U/L
BASOPHILS # BLD AUTO: 0.02 K/UL — SIGNIFICANT CHANGE UP (ref 0–0.2)
BASOPHILS NFR BLD AUTO: 0.5 % — SIGNIFICANT CHANGE UP (ref 0–2)
BILIRUB SERPL-MCNC: 0.4 MG/DL
BUN SERPL-MCNC: 45 MG/DL
CALCIUM SERPL-MCNC: 8.3 MG/DL
CHLORIDE SERPL-SCNC: 101 MMOL/L
CO2 SERPL-SCNC: 24 MMOL/L
CREAT SERPL-MCNC: 3.6 MG/DL
EOSINOPHIL # BLD AUTO: 0.05 K/UL — SIGNIFICANT CHANGE UP (ref 0–0.5)
EOSINOPHIL NFR BLD AUTO: 1.2 % — SIGNIFICANT CHANGE UP (ref 0–6)
GLUCOSE SERPL-MCNC: 149 MG/DL
HCT VFR BLD CALC: 26.4 % — LOW (ref 39–50)
HGB BLD-MCNC: 8.6 G/DL — LOW (ref 13–17)
IMM GRANULOCYTES NFR BLD AUTO: 0.5 % — SIGNIFICANT CHANGE UP (ref 0–1.5)
LYMPHOCYTES # BLD AUTO: 0.39 K/UL — LOW (ref 1–3.3)
LYMPHOCYTES # BLD AUTO: 9.4 % — LOW (ref 13–44)
MCHC RBC-ENTMCNC: 27.9 PG — SIGNIFICANT CHANGE UP (ref 27–34)
MCHC RBC-ENTMCNC: 32.6 G/DL — SIGNIFICANT CHANGE UP (ref 32–36)
MCV RBC AUTO: 85.7 FL — SIGNIFICANT CHANGE UP (ref 80–100)
MONOCYTES # BLD AUTO: 0.37 K/UL — SIGNIFICANT CHANGE UP (ref 0–0.9)
MONOCYTES NFR BLD AUTO: 8.9 % — SIGNIFICANT CHANGE UP (ref 2–14)
NEUTROPHILS # BLD AUTO: 3.29 K/UL — SIGNIFICANT CHANGE UP (ref 1.8–7.4)
NEUTROPHILS NFR BLD AUTO: 79.5 % — HIGH (ref 43–77)
NRBC # BLD: 0 /100 WBCS — SIGNIFICANT CHANGE UP (ref 0–0)
PLATELET # BLD AUTO: 104 K/UL — LOW (ref 150–400)
POTASSIUM SERPL-SCNC: 3.6 MMOL/L
PROT SERPL-MCNC: 6 G/DL
RBC # BLD: 3.08 M/UL — LOW (ref 4.2–5.8)
RBC # FLD: 13.5 % — SIGNIFICANT CHANGE UP (ref 10.3–14.5)
SODIUM SERPL-SCNC: 140 MMOL/L
WBC # BLD: 4.14 K/UL — SIGNIFICANT CHANGE UP (ref 3.8–10.5)
WBC # FLD AUTO: 4.14 K/UL — SIGNIFICANT CHANGE UP (ref 3.8–10.5)

## 2021-10-07 PROCEDURE — 71250 CT THORAX DX C-: CPT | Mod: 26

## 2021-10-07 PROCEDURE — 71250 CT THORAX DX C-: CPT

## 2021-10-08 DIAGNOSIS — J18.9 PNEUMONIA, UNSPECIFIED ORGANISM: ICD-10-CM

## 2021-10-08 RX ORDER — LEVOFLOXACIN 750 MG/1
750 TABLET, FILM COATED ORAL
Qty: 5 | Refills: 0 | Status: ACTIVE | COMMUNITY
Start: 2021-10-08 | End: 1900-01-01

## 2021-10-14 ENCOUNTER — RESULT REVIEW (OUTPATIENT)
Age: 76
End: 2021-10-14

## 2021-10-14 ENCOUNTER — APPOINTMENT (OUTPATIENT)
Dept: HEMATOLOGY ONCOLOGY | Facility: CLINIC | Age: 76
End: 2021-10-14

## 2021-10-14 LAB
BASOPHILS # BLD AUTO: 0.02 K/UL — SIGNIFICANT CHANGE UP (ref 0–0.2)
BASOPHILS NFR BLD AUTO: 0.4 % — SIGNIFICANT CHANGE UP (ref 0–2)
EOSINOPHIL # BLD AUTO: 0.09 K/UL — SIGNIFICANT CHANGE UP (ref 0–0.5)
EOSINOPHIL NFR BLD AUTO: 1.9 % — SIGNIFICANT CHANGE UP (ref 0–6)
HCT VFR BLD CALC: 22.7 % — LOW (ref 39–50)
HGB BLD-MCNC: 7.4 G/DL — LOW (ref 13–17)
IMM GRANULOCYTES NFR BLD AUTO: 0.6 % — SIGNIFICANT CHANGE UP (ref 0–1.5)
LYMPHOCYTES # BLD AUTO: 0.5 K/UL — LOW (ref 1–3.3)
LYMPHOCYTES # BLD AUTO: 10.7 % — LOW (ref 13–44)
MCHC RBC-ENTMCNC: 27.7 PG — SIGNIFICANT CHANGE UP (ref 27–34)
MCHC RBC-ENTMCNC: 32.6 G/DL — SIGNIFICANT CHANGE UP (ref 32–36)
MCV RBC AUTO: 85 FL — SIGNIFICANT CHANGE UP (ref 80–100)
MONOCYTES # BLD AUTO: 0.56 K/UL — SIGNIFICANT CHANGE UP (ref 0–0.9)
MONOCYTES NFR BLD AUTO: 12 % — SIGNIFICANT CHANGE UP (ref 2–14)
NEUTROPHILS # BLD AUTO: 3.48 K/UL — SIGNIFICANT CHANGE UP (ref 1.8–7.4)
NEUTROPHILS NFR BLD AUTO: 74.4 % — SIGNIFICANT CHANGE UP (ref 43–77)
NRBC # BLD: 0 /100 WBCS — SIGNIFICANT CHANGE UP (ref 0–0)
PLATELET # BLD AUTO: 124 K/UL — LOW (ref 150–400)
RBC # BLD: 2.67 M/UL — LOW (ref 4.2–5.8)
RBC # FLD: 13.8 % — SIGNIFICANT CHANGE UP (ref 10.3–14.5)
WBC # BLD: 4.68 K/UL — SIGNIFICANT CHANGE UP (ref 3.8–10.5)
WBC # FLD AUTO: 4.68 K/UL — SIGNIFICANT CHANGE UP (ref 3.8–10.5)

## 2021-10-15 ENCOUNTER — APPOINTMENT (OUTPATIENT)
Dept: NEPHROLOGY | Facility: CLINIC | Age: 76
End: 2021-10-15
Payer: COMMERCIAL

## 2021-10-15 VITALS
HEART RATE: 60 BPM | BODY MASS INDEX: 28.74 KG/M2 | HEIGHT: 68.5 IN | WEIGHT: 191.8 LBS | DIASTOLIC BLOOD PRESSURE: 60 MMHG | SYSTOLIC BLOOD PRESSURE: 144 MMHG

## 2021-10-15 DIAGNOSIS — E87.2 ACIDOSIS: ICD-10-CM

## 2021-10-15 DIAGNOSIS — I10 ESSENTIAL (PRIMARY) HYPERTENSION: ICD-10-CM

## 2021-10-15 DIAGNOSIS — N17.9 ACUTE KIDNEY FAILURE, UNSPECIFIED: ICD-10-CM

## 2021-10-15 LAB
25(OH)D3 SERPL-MCNC: 27.3 NG/ML
ALBUMIN SERPL ELPH-MCNC: 3.1 G/DL
ANION GAP SERPL CALC-SCNC: 14 MMOL/L
APPEARANCE: CLEAR
BACTERIA: NEGATIVE
BILIRUBIN URINE: NEGATIVE
BLOOD URINE: NEGATIVE
BUN SERPL-MCNC: 47 MG/DL
CALCIUM SERPL-MCNC: 8.3 MG/DL
CALCIUM SERPL-MCNC: 8.3 MG/DL
CHLORIDE SERPL-SCNC: 104 MMOL/L
CO2 SERPL-SCNC: 22 MMOL/L
COLOR: YELLOW
CREAT SERPL-MCNC: 3.7 MG/DL
CREAT SPEC-SCNC: 128 MG/DL
GLUCOSE QUALITATIVE U: NEGATIVE
GLUCOSE SERPL-MCNC: 137 MG/DL
HYALINE CASTS: 0 /LPF
KETONES URINE: NEGATIVE
LEUKOCYTE ESTERASE URINE: NEGATIVE
MICROALBUMIN 24H UR DL<=1MG/L-MCNC: 125.5 MG/DL
MICROALBUMIN/CREAT 24H UR-RTO: 983 MG/G
MICROSCOPIC-UA: NORMAL
NITRITE URINE: NEGATIVE
PARATHYROID HORMONE INTACT: 202 PG/ML
PH URINE: 6
PHOSPHATE SERPL-MCNC: 3.4 MG/DL
POTASSIUM SERPL-SCNC: 3.5 MMOL/L
PROTEIN URINE: ABNORMAL
RED BLOOD CELLS URINE: 6 /HPF
SODIUM SERPL-SCNC: 140 MMOL/L
SPECIFIC GRAVITY URINE: 1.01
SQUAMOUS EPITHELIAL CELLS: 1 /HPF
URATE SERPL-MCNC: 9.6 MG/DL
UROBILINOGEN URINE: NORMAL
WHITE BLOOD CELLS URINE: 2 /HPF

## 2021-10-15 PROCEDURE — 99214 OFFICE O/P EST MOD 30 MIN: CPT

## 2021-10-15 RX ORDER — SODIUM ZIRCONIUM CYCLOSILICATE 10 G/10G
10 POWDER, FOR SUSPENSION ORAL
Qty: 2 | Refills: 3 | Status: DISCONTINUED | COMMUNITY
Start: 2020-06-17 | End: 2021-10-15

## 2021-10-17 PROBLEM — E87.2 ACIDOSIS, METABOLIC: Status: ACTIVE | Noted: 2019-02-14

## 2021-10-17 PROBLEM — N17.9 AKI (ACUTE KIDNEY INJURY): Status: ACTIVE | Noted: 2021-10-17

## 2021-10-17 NOTE — ASSESSMENT
[FreeTextEntry1] : 75 yo male with HTN, proteinuria and CKD4, hyperkalemia, gout, stage IV NET\par CKD4 in setting of HTN\par FAWN from ascites and underlying neuroendocrine tumor\par No signs of nephrotic syndrome to explain ascites so more likely from his tumor\par Going for renal sono and doppler\par Everolimus on hold for now\par Need for paracentesis again\par Keep fluid restricted 1 liter daily\par Hyperkalemia: Improved and now low. Stay off lokelma\par HTN controlled \par Gout : no recent flares\par Proteinuria: stable\par Anemia: follow with heme/onc\par Neuroendocrine tumor s/p liver radiofreq emobol- Followup with Dr Boothe and Dr Mcgovern for further management\par Will trend renal function\par

## 2021-10-17 NOTE — HISTORY OF PRESENT ILLNESS
[FreeTextEntry1] : 77 yo HTN, CKD4 FAWN here for visit. \par He has been following with Dr Mcgovern for his neuroendocrine tumor and had been on everolimus\par He has worsening ascites and had tapped\par His renal function has been worsening on recent labs and also ascites worsening again.\par Everolimus was held\par He denies any new issues with nausea or vomiting \par He is complaining of lower ext edema and 10lb wt gain from fluid in legs and fullness in belly

## 2021-10-17 NOTE — PHYSICAL EXAM
[General Appearance - Alert] : alert [General Appearance - In No Acute Distress] : in no acute distress [Sclera] : the sclera and conjunctiva were normal [Outer Ear] : the ears and nose were normal in appearance [Neck Appearance] : the appearance of the neck was normal [] : no respiratory distress [Auscultation Breath Sounds / Voice Sounds] : lungs were clear to auscultation bilaterally [Heart Rate And Rhythm] : heart rate was normal and rhythm regular [Heart Sounds] : normal S1 and S2 [Heart Sounds Pericardial Friction Rub] : no pericardial rub [Bowel Sounds] : normal bowel sounds [Abdomen Tenderness] : non-tender [No CVA Tenderness] : no ~M costovertebral angle tenderness [Involuntary Movements] : no involuntary movements were seen [No Focal Deficits] : no focal deficits [Oriented To Time, Place, And Person] : oriented to person, place, and time [FreeTextEntry1] : distended

## 2021-10-18 ENCOUNTER — OUTPATIENT (OUTPATIENT)
Dept: OUTPATIENT SERVICES | Facility: HOSPITAL | Age: 76
LOS: 1 days | Discharge: HOME | End: 2021-10-18

## 2021-10-18 DIAGNOSIS — F43.20 ADJUSTMENT DISORDER, UNSPECIFIED: ICD-10-CM

## 2021-10-18 DIAGNOSIS — Z90.49 ACQUIRED ABSENCE OF OTHER SPECIFIED PARTS OF DIGESTIVE TRACT: Chronic | ICD-10-CM

## 2021-10-18 DIAGNOSIS — Z90.79 ACQUIRED ABSENCE OF OTHER GENITAL ORGAN(S): Chronic | ICD-10-CM

## 2021-10-19 ENCOUNTER — RESULT REVIEW (OUTPATIENT)
Age: 76
End: 2021-10-19

## 2021-10-20 ENCOUNTER — NON-APPOINTMENT (OUTPATIENT)
Age: 76
End: 2021-10-20

## 2021-10-20 ENCOUNTER — APPOINTMENT (OUTPATIENT)
Dept: HEMATOLOGY ONCOLOGY | Facility: CLINIC | Age: 76
End: 2021-10-20

## 2021-10-20 ENCOUNTER — APPOINTMENT (OUTPATIENT)
Dept: ULTRASOUND IMAGING | Facility: IMAGING CENTER | Age: 76
End: 2021-10-20

## 2021-10-22 ENCOUNTER — TRANSCRIPTION ENCOUNTER (OUTPATIENT)
Age: 76
End: 2021-10-22

## 2021-10-22 ENCOUNTER — RESULT REVIEW (OUTPATIENT)
Age: 76
End: 2021-10-22

## 2021-10-22 ENCOUNTER — APPOINTMENT (OUTPATIENT)
Dept: HEMATOLOGY ONCOLOGY | Facility: CLINIC | Age: 76
End: 2021-10-22

## 2021-10-22 LAB
BASOPHILS # BLD AUTO: 0.04 K/UL — SIGNIFICANT CHANGE UP (ref 0–0.2)
BASOPHILS NFR BLD AUTO: 0.7 % — SIGNIFICANT CHANGE UP (ref 0–2)
EOSINOPHIL # BLD AUTO: 0.02 K/UL — SIGNIFICANT CHANGE UP (ref 0–0.5)
EOSINOPHIL NFR BLD AUTO: 0.3 % — SIGNIFICANT CHANGE UP (ref 0–6)
HCT VFR BLD CALC: 22.6 % — LOW (ref 39–50)
HGB BLD-MCNC: 7.5 G/DL — LOW (ref 13–17)
IMM GRANULOCYTES NFR BLD AUTO: 0.5 % — SIGNIFICANT CHANGE UP (ref 0–1.5)
LYMPHOCYTES # BLD AUTO: 0.61 K/UL — LOW (ref 1–3.3)
LYMPHOCYTES # BLD AUTO: 10.5 % — LOW (ref 13–44)
MCHC RBC-ENTMCNC: 28.2 PG — SIGNIFICANT CHANGE UP (ref 27–34)
MCHC RBC-ENTMCNC: 33.2 G/DL — SIGNIFICANT CHANGE UP (ref 32–36)
MCV RBC AUTO: 85 FL — SIGNIFICANT CHANGE UP (ref 80–100)
MONOCYTES # BLD AUTO: 0.69 K/UL — SIGNIFICANT CHANGE UP (ref 0–0.9)
MONOCYTES NFR BLD AUTO: 11.9 % — SIGNIFICANT CHANGE UP (ref 2–14)
NEUTROPHILS # BLD AUTO: 4.41 K/UL — SIGNIFICANT CHANGE UP (ref 1.8–7.4)
NEUTROPHILS NFR BLD AUTO: 76.1 % — SIGNIFICANT CHANGE UP (ref 43–77)
NRBC # BLD: 0 /100 WBCS — SIGNIFICANT CHANGE UP (ref 0–0)
PLATELET # BLD AUTO: 154 K/UL — SIGNIFICANT CHANGE UP (ref 150–400)
RBC # BLD: 2.66 M/UL — LOW (ref 4.2–5.8)
RBC # FLD: 14.2 % — SIGNIFICANT CHANGE UP (ref 10.3–14.5)
WBC # BLD: 5.8 K/UL — SIGNIFICANT CHANGE UP (ref 3.8–10.5)
WBC # FLD AUTO: 5.8 K/UL — SIGNIFICANT CHANGE UP (ref 3.8–10.5)

## 2021-10-24 LAB
ALBUMIN SERPL ELPH-MCNC: 3.3 G/DL
ALP BLD-CCNC: 376 U/L
ALT SERPL-CCNC: 22 U/L
ANION GAP SERPL CALC-SCNC: 14 MMOL/L
APTT BLD: 26.3 SEC
AST SERPL-CCNC: 34 U/L
BILIRUB SERPL-MCNC: 0.4 MG/DL
BUN SERPL-MCNC: 41 MG/DL
CALCIUM SERPL-MCNC: 8.4 MG/DL
CHLORIDE SERPL-SCNC: 101 MMOL/L
CO2 SERPL-SCNC: 24 MMOL/L
CREAT SERPL-MCNC: 3.12 MG/DL
GLUCOSE SERPL-MCNC: 109 MG/DL
INR PPP: 1.01 RATIO
POTASSIUM SERPL-SCNC: 4 MMOL/L
PROT SERPL-MCNC: 6.1 G/DL
PT BLD: 11.9 SEC
SODIUM SERPL-SCNC: 140 MMOL/L

## 2021-10-26 ENCOUNTER — APPOINTMENT (OUTPATIENT)
Dept: MRI IMAGING | Facility: IMAGING CENTER | Age: 76
End: 2021-10-26
Payer: COMMERCIAL

## 2021-10-26 ENCOUNTER — RESULT REVIEW (OUTPATIENT)
Age: 76
End: 2021-10-26

## 2021-10-26 ENCOUNTER — OUTPATIENT (OUTPATIENT)
Dept: OUTPATIENT SERVICES | Facility: HOSPITAL | Age: 76
LOS: 1 days | End: 2021-10-26
Payer: COMMERCIAL

## 2021-10-26 ENCOUNTER — APPOINTMENT (OUTPATIENT)
Dept: ULTRASOUND IMAGING | Facility: IMAGING CENTER | Age: 76
End: 2021-10-26
Payer: COMMERCIAL

## 2021-10-26 DIAGNOSIS — Z90.79 ACQUIRED ABSENCE OF OTHER GENITAL ORGAN(S): Chronic | ICD-10-CM

## 2021-10-26 DIAGNOSIS — Z00.8 ENCOUNTER FOR OTHER GENERAL EXAMINATION: ICD-10-CM

## 2021-10-26 DIAGNOSIS — Z90.49 ACQUIRED ABSENCE OF OTHER SPECIFIED PARTS OF DIGESTIVE TRACT: Chronic | ICD-10-CM

## 2021-10-26 DIAGNOSIS — N18.4 CHRONIC KIDNEY DISEASE, STAGE 4 (SEVERE): ICD-10-CM

## 2021-10-26 PROCEDURE — 49083 ABD PARACENTESIS W/IMAGING: CPT

## 2021-10-26 PROCEDURE — 74183 MRI ABD W/O CNTR FLWD CNTR: CPT | Mod: 26

## 2021-10-26 PROCEDURE — 93975 VASCULAR STUDY: CPT | Mod: 26

## 2021-10-26 PROCEDURE — 72197 MRI PELVIS W/O & W/DYE: CPT | Mod: 26

## 2021-10-26 PROCEDURE — 72197 MRI PELVIS W/O & W/DYE: CPT

## 2021-10-26 PROCEDURE — 74183 MRI ABD W/O CNTR FLWD CNTR: CPT

## 2021-10-26 PROCEDURE — 93975 VASCULAR STUDY: CPT

## 2021-10-28 ENCOUNTER — APPOINTMENT (OUTPATIENT)
Dept: INFUSION THERAPY | Facility: HOSPITAL | Age: 76
End: 2021-10-28

## 2021-10-29 ENCOUNTER — RX RENEWAL (OUTPATIENT)
Age: 76
End: 2021-10-29

## 2021-10-29 RX ORDER — FUROSEMIDE 40 MG/1
40 TABLET ORAL DAILY
Qty: 90 | Refills: 3 | Status: ACTIVE | COMMUNITY
Start: 2020-12-08 | End: 1900-01-01

## 2021-10-31 NOTE — PHYSICAL EXAM
[Restricted in physically strenuous activity but ambulatory and able to carry out work of a light or sedentary nature] : Status 1- Restricted in physically strenuous activity but ambulatory and able to carry out work of a light or sedentary nature, e.g., light house work, office work [Cachectic] : cachectic [Normal] : normal spine exam without palpable tenderness, no kyphosis or scoliosis [de-identified] : evidence of cachexia as can see parts of ribcage now [de-identified] : + periorbital edema b/l [de-identified] : Supple, +JVP [de-identified] : frequent PVC [de-identified] : + abd distention, ~41 inch girth circumference, with + fluid wave. NTTP, no HSM

## 2021-10-31 NOTE — REVIEW OF SYSTEMS
[Fatigue] : fatigue [Negative] : Allergic/Immunologic [Recent Change In Weight] : ~T recent weight change [Lower Ext Edema] : lower extremity edema [Cough] : cough [Shortness Of Breath] : no shortness of breath [Chest Pain] : no chest pain [SOB on Exertion] : no shortness of breath during exertion

## 2021-10-31 NOTE — HISTORY OF PRESENT ILLNESS
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 Pt initially had Q 6 mos MRI a/p, then annual scans. Previous scan in June 2018 showed stable mesenteric mass and then patient delayed his follow up scan till Nov 2019. MRI Abd/Pelvis showed new bilobar liver lesions suspicious for hepatic mets, two new lesions at T12 and L3 concerning for bone mets. Patient lives in [place] and saw Dr. Saenz, medical oncology, who recommended a liver biopsy but wanted a second opinion. 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, mesenteric and R inguinal LN, multiple anterior abdominal wall mets, \par 1/17/20: Liver bx: well diff NET, Ki 30-70% (upon re-review)\par 2/14/20: Lanreotide monthly started \par 5/15/20: CT Chest: clear lungs\par 6/5/20: MRI A/P: Interval increase in size of bilobar hepatic lesions \par 7/25/20: MRI A/P: interval incr in bilobar liver mets, rest of disease essentially unchanged, ?incr in size of T12 and femoral head mets\par 7/27/20: SIRT \par 8/24/20: MRI Abd: stable disease, improvement in liver mets\par 9/9/20: SIRT #2\par 9/18/20: CT Chest : unchanged \par 10/11/20: MRI A/P: R hepatic lobe decrease in size, otherwise stable disease\par 1/15/21: CT Chest, MR A/P: stable disease\par 5/6/21: CT Chest, MR A/P: stable disease, mesenteric mass slightly increased in size \par 7/14/21: CT Chest: stable pulm nodules, new indeterminant subdiaphragmatic LN MRI A/P: slight incr in mesenteric mass, new peritoneal disease with worsening ascites, hepatic disease without change \par 8/21/22: Everolimus 5 mg  [de-identified] : well differentiated NET, Ki67 30-70% [de-identified] : 2012 Mesenteric mass bx: well differentiated NET, Ki <3%, mitotic rate low [de-identified] : Pt seen in office. He is s/p paracentesis on 9/17, which helped his abdominal distention. Since then, it has not worsened or improved. Otherwise, his symptoms have remained the same, including fatigue, and periorbital and LE edema. He still notes a poor appetite and denies N/V/D/C. He notes a slight cough the past few days, without fever, CP, or SOB.

## 2021-10-31 NOTE — ASSESSMENT
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 ANC and plt have decreased but still within parameters to cont. Unclear if will be able to titrate up to 100% dose, particularly as renal function is worsening. Nephro appt is on 10/15\par     - F/u repeat labwork\par     - Repeat surveillance MR abdomen w/IV contrast and CT chest with IV contrast ordered today\par     - RTO in 2 weeks\par \par Ascites (789.59) (R18.8)\par     - clinically apparent ascites\par     - S/p 2.4L paracentesis on 9/17, cyto negative and albumin 1.0 (SAAG 2.4), which leans toward non-malignant cause of ascites but still given patient's history, malignant ascites still on ddx\par     - MR abd from July 2021 does not note cirrhosis, but will obtain repeat MR to eval for pseudocirrhosis 2/2 liver disease burden\par     - Continue diuresis and monitoring, tx as above\par \par 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 Mcgovern.\par \par \par Aryles Hedjar, MD, PGY-4\par Hematology/Oncology Fellow\par [facility]

## 2021-11-01 ENCOUNTER — APPOINTMENT (OUTPATIENT)
Dept: HEMATOLOGY ONCOLOGY | Facility: CLINIC | Age: 76
End: 2021-11-01
Payer: COMMERCIAL

## 2021-11-01 DIAGNOSIS — N18.4 CHRONIC KIDNEY DISEASE, STAGE 4 (SEVERE): ICD-10-CM

## 2021-11-01 DIAGNOSIS — I31.3 PERICARDIAL EFFUSION (NONINFLAMMATORY): ICD-10-CM

## 2021-11-01 PROCEDURE — 99449 NTRPROF PH1/NTRNET/EHR 31/>: CPT

## 2021-11-01 RX ORDER — OXYCODONE 5 MG/1
5 TABLET ORAL
Qty: 45 | Refills: 0 | Status: ACTIVE | COMMUNITY
Start: 2021-10-29 | End: 1900-01-01

## 2021-11-01 NOTE — ASSESSMENT
[Palliative] : Goals of care discussed with patient: Palliative [Palliative Care Plan] : not applicable at this time - BP improving ; however tends to drop on HD days  - c/w Midodrine 10mg po tid - follows with Dr Kamilla Galeano  - gets HD T/Th/Sat outpatient  - HD per Renal

## 2021-11-01 NOTE — REVIEW OF SYSTEMS
[Fatigue] : fatigue [Lower Ext Edema] : lower extremity edema [Joint Pain] : joint pain [Joint Stiffness] : joint stiffness [Negative] : Allergic/Immunologic

## 2021-11-01 NOTE — HISTORY OF PRESENT ILLNESS
[Home] : at home, [unfilled] , at the time of the visit. 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 Pt initially had Q 6 mos MRI a/p, then annual scans. Previous scan in June 2018 showed stable mesenteric mass and then patient delayed his follow up scan till Nov 2019. MRI Abd/Pelvis showed new bilobar liver lesions suspicious for hepatic mets, two new lesions at T12 and L3 concerning for bone mets. Patient lives in [place] and saw Dr. Saenz, medical oncology, who recommended a liver biopsy but wanted a second opinion. 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, mesenteric and R inguinal LN, multiple anterior abdominal wall mets, \par 1/17/20: Liver bx: well diff NET, Ki 30-70% (upon re-review)\par 2/14/20: Lanreotide monthly started \par 5/15/20: CT Chest: clear lungs\par 6/5/20: MRI A/P: Interval increase in size of bilobar hepatic lesions \par 7/25/20: MRI A/P: interval incr in bilobar liver mets, rest of disease essentially unchanged, ?incr in size of T12 and femoral head mets\par 7/27/20: SIRT \par 8/24/20: MRI Abd: stable disease, improvement in liver mets\par 9/9/20: SIRT #2\par 9/18/20: CT Chest : unchanged \par 10/11/20: MRI A/P: R hepatic lobe decrease in size, otherwise stable disease\par 1/15/21: CT Chest, MR A/P: stable disease\par 5/6/21: CT Chest, MR A/P: stable disease, mesenteric mass slightly increased in size \par 7/14/21: CT Chest: stable pulm nodules, new indeterminant subdiaphragmatic LN MRI A/P: slight incr in mesenteric mass, new peritoneal disease with worsening ascites, hepatic disease without change \par 8/21/22: Everolimus 5 mg daily \par 9/17/21: Paracentesis: cyto neg, SAAG >1\par 10/7/21: Noted to have elevated Cr, worsening cough, possible PNA, Everolimus held. Worsening LE edema, ascites \par 10/7/21: CT Chest: LLL PNA? L pleural nodules some have increased in size\par 10/26/21: MRI A/P: POD in bone, liver, mesenteric mass, pericardial effusion [de-identified] : well differentiated NET, Ki67 30-70% [de-identified] : 2012 Mesenteric mass bx: well differentiated NET, Ki <3%, mitotic rate low [FreeTextEntry1] : has been off Everolimus x 1 mos  [de-identified] : worsening LBP over the weekend. Took Oxycodone on friday and this  morning that he had at home from years ago. Pain radiates up toward neck. Keeping him up at night. No difficulty ambulating. Overall generally weak. Had 6 L drained on 10/26 and felt better immediately but now feels like reaccumulating again. Poor appetite. LE edema b/l \par Denies any worsening SOB/CP.

## 2021-11-02 ENCOUNTER — APPOINTMENT (OUTPATIENT)
Dept: HEPATOLOGY | Facility: CLINIC | Age: 76
End: 2021-11-02
Payer: COMMERCIAL

## 2021-11-02 VITALS
BODY MASS INDEX: 26.37 KG/M2 | TEMPERATURE: 98.1 F | RESPIRATION RATE: 16 BRPM | DIASTOLIC BLOOD PRESSURE: 73 MMHG | SYSTOLIC BLOOD PRESSURE: 143 MMHG | OXYGEN SATURATION: 96 % | WEIGHT: 176 LBS | HEART RATE: 91 BPM | HEIGHT: 68.5 IN

## 2021-11-02 PROCEDURE — 99204 OFFICE O/P NEW MOD 45 MIN: CPT

## 2021-11-02 NOTE — PHYSICAL EXAM
[Scleral Icterus] : No Scleral Icterus [Spider Angioma] : No spider angioma(s) were observed [Abdominal  Ascites] : ascites [Ascites Fluid Wave] : positive ascites fluid wave [Ascites Tense] : ascites is not tense [Ascites Shifting Dullness] : shifting dullness if ascites [Non-Tender] : non-tender [Asterixis] : no asterixis observed [Jaundice] : No jaundice [Depression] : no depression [General Appearance - Alert] : alert [General Appearance - In No Acute Distress] : in no acute distress [Sclera] : the sclera and conjunctiva were normal [Neck Appearance] : the appearance of the neck was normal [] : no respiratory distress [Bowel Sounds] : normal bowel sounds [Abdomen Soft] : soft [Abdomen Tenderness] : non-tender [Abnormal Walk] : normal gait [Skin Color & Pigmentation] : normal skin color and pigmentation [Skin Turgor] : normal skin turgor [Oriented To Time, Place, And Person] : oriented to person, place, and time [Impaired Insight] : insight and judgment were intact [Affect] : the affect was normal

## 2021-11-02 NOTE — HISTORY OF PRESENT ILLNESS
[de-identified] : 75 y/o M w/ hx of CKD, well differentiated neuroendocrine tumor of unknown primary (since 2012) w/ hepatic mets and bone mets s/p SIRT x 2 in 2020 pericardial effusion here for ascites.\par \par He was previously on Lanreotide with POD.\par He was on Everolimus which was stopped about a week ago.\par Had a liver biopsy.\par MRI 10/26/21 - pseudocirrhotic appearance due to underlying metastases. No significant steatosis. Extensive bilobar hepatic metastatic disease with several lesions demonstrating interval increase in size. \par Received LVP on 10/26/21, 9/16/21.\par TTE 5/2021 with normal EF. \par + ascites accumulation since the summer of 2021 requiring two LVPs so far.\par He is on lasix 40 mg daily but limited due to kidney function.

## 2021-11-02 NOTE — ASSESSMENT
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 Check cell count, cultures, albumin, total protein, cytology with next tap.\par Not a TIPS candidate due to malignancy.\par May improve with further treatment of neuroendocrine cancer.\par Told to weigh himself daily.\par Limit salt intake.\par Can consider tunneled peritoneal drain but would need daily abx and mostly in the palliative setting.\par \par RTC 2-3 months

## 2021-11-04 ENCOUNTER — APPOINTMENT (OUTPATIENT)
Dept: MRI IMAGING | Facility: IMAGING CENTER | Age: 76
End: 2021-11-04
Payer: COMMERCIAL

## 2021-11-04 ENCOUNTER — OUTPATIENT (OUTPATIENT)
Dept: OUTPATIENT SERVICES | Facility: HOSPITAL | Age: 76
LOS: 1 days | End: 2021-11-04
Payer: COMMERCIAL

## 2021-11-04 DIAGNOSIS — Z90.49 ACQUIRED ABSENCE OF OTHER SPECIFIED PARTS OF DIGESTIVE TRACT: Chronic | ICD-10-CM

## 2021-11-04 DIAGNOSIS — Z90.79 ACQUIRED ABSENCE OF OTHER GENITAL ORGAN(S): Chronic | ICD-10-CM

## 2021-11-04 DIAGNOSIS — M54.50 LOW BACK PAIN, UNSPECIFIED: ICD-10-CM

## 2021-11-04 DIAGNOSIS — Z00.8 ENCOUNTER FOR OTHER GENERAL EXAMINATION: ICD-10-CM

## 2021-11-04 PROCEDURE — 72157 MRI CHEST SPINE W/O & W/DYE: CPT | Mod: 26

## 2021-11-04 PROCEDURE — 72158 MRI LUMBAR SPINE W/O & W/DYE: CPT | Mod: 26

## 2021-11-04 PROCEDURE — 72157 MRI CHEST SPINE W/O & W/DYE: CPT

## 2021-11-04 PROCEDURE — A9585: CPT

## 2021-11-04 PROCEDURE — 72158 MRI LUMBAR SPINE W/O & W/DYE: CPT

## 2021-11-08 ENCOUNTER — OUTPATIENT (OUTPATIENT)
Dept: OUTPATIENT SERVICES | Facility: HOSPITAL | Age: 76
LOS: 1 days | Discharge: HOME | End: 2021-11-08
Payer: COMMERCIAL

## 2021-11-08 ENCOUNTER — APPOINTMENT (OUTPATIENT)
Dept: RADIATION ONCOLOGY | Facility: CLINIC | Age: 76
End: 2021-11-08
Payer: COMMERCIAL

## 2021-11-08 DIAGNOSIS — Z90.79 ACQUIRED ABSENCE OF OTHER GENITAL ORGAN(S): Chronic | ICD-10-CM

## 2021-11-08 DIAGNOSIS — Z90.49 ACQUIRED ABSENCE OF OTHER SPECIFIED PARTS OF DIGESTIVE TRACT: Chronic | ICD-10-CM

## 2021-11-08 PROCEDURE — 99213 OFFICE O/P EST LOW 20 MIN: CPT | Mod: 25,GC,95

## 2021-11-09 ENCOUNTER — APPOINTMENT (OUTPATIENT)
Dept: ULTRASOUND IMAGING | Facility: IMAGING CENTER | Age: 76
End: 2021-11-09
Payer: COMMERCIAL

## 2021-11-10 NOTE — VITALS
[facility] FOLLOW UP NOTE  HEMATOLOGY AND ONCOLOGY    ASSESSMENT AND PLAN   In summary, 55 year old male with metastatic squamous cell carcinoma of the lung as below here to follow-up.    He is here to be evaluated on cycle 3 day 11 of docetaxel ramcirumab.  Notice started to have more abdominal pain and was referred to Dr. Gutierrez in Radiation Oncology for which he will talk with tomorrow.  I do think is reasonable to proceed with palliative radiation to the right rectus abdominis lesion given persistent pain.    We also went through his restaging scan again.  Cardia lesion is stable, and it appears he is having disease response in the lung as well as in the muscle.    Will give him 1L NS today. Will also book an infusion appt on 12/24/2020, but he understands to call and cancel it if he starts to feel well.    RTC 01/08/2020 for C4 docetaxel ramcirumab, zometa    Cont fent patch.    IMPRESSION:  1. Dehydration. 1L NS today, and potentially 12/24/2020  2. Metastatic squamous cell carcinoma of the lung. PD on carbo-taxol-pembro, now on docetaxel ramicirumab. Disease responding. Today C3D11.  3. Rectus abdominis mass. Pt will have [facility] appointment with Dr Gutierrez tomorrow  4. Bone metastasis.  Zometa LD: 10/28/2020. Will do Zometa upon return  5. Cancer related pain. Cont fent patch and dilaudid PRN. Pt to be eval by [facility] for palliative RT  6. Intracardiac lesion.  Stable    PLAN:  1. Lab and imaging reviewed  2. 1L NS today  3. On 12/24/2020: possible IV fluid. Pt understand to contact us if he feels well on Wed  4. Pt to see Dr Gutierrez for palliative RT  5. RTC 1/8/2020, CBC/CMP, docetaxel-ramcirumab, Zometa    Discussed with patient the natural history, course and prognosis of illness.    Therapeutic options discussed and explained.  Side effects, risks and benefits, and alternatives discussed.  Patient acknowledges understanding of disease and treatment plan.    All questions were answered  satisfactorily. Patient is instructed to contact us should issue arise prior to his next scheduled appointment. I spent 35 minutes, with greater than 50% of which on face-to-face counseling, education and planning of care.    Ulices Morris MD, MPH  [facility] [place]            [facility]   CANCER STAGING  Oncology History   Lung cancer metastatic to bone (CMS/HCC)   7/17/2020 Initial Diagnosis    Lung cancer metastatic to bone (CMS/HCC)     7/24/2020 -  Cancer Staged    Staging form: Lung, AJCC 8th Edition  - Clinical stage from 7/24/2020: Stage IVB (pM1c) - Signed by Ulices Morris MD on 7/27/2020 8/18/2020 - 10/7/2020 Chemotherapy    PACLitaxel (semi-synthetic) (TAXOL) 366 mg in sodium chloride 0.9 % 500 mL chemo infusion, 3 of 6 cycles  Administration: 366 mg (8/18/2020), 366 mg (9/8/2020), 366 mg (9/30/2020)  CARBOplatin (PARAPLATIN) 600 mg in sodium chloride 0.9 % 250 mL chemo infusion, 3 of 6 cycles  Administration: 600 mg (8/18/2020), 750 mg (9/8/2020), 750 mg (9/30/2020)     10/28/2020 -  Chemotherapy    DOCEtaxel (TAXOTERE) 146 mg in sodium chloride 0.9 % 250 mL chemo infusion, 3 of 6 cycles  Administration: 146 mg (10/28/2020), 116 mg (11/18/2020), 116 mg (12/11/2020)  RAMUCirumab (CYRAMZA) 700 mg in sodium chloride 0.9 % 250 mL chemo infusion, 3 of 6 cycles  Administration: 700 mg (10/28/2020), 700 mg (11/18/2020), 700 mg (12/11/2020)     Secondary malignant neoplasm of bone (CMS/HCC)   7/24/2020 Initial Diagnosis    Secondary malignant neoplasm of bone (CMS/HCC)     9/8/2020 -  Supportive Treatment    Treatment Summary   Treatment goal Supportive   Plan Name ZOLEDRONIC ACID (ZOMETA) EVERY 3 MONTHS: BONE METASTASES/MULTIPLE MYELOMA   Status Active   Start Date 9/8/2020   End Date 1/6/2023 (Planned)   Provider Ulices Morris MD   Chemotherapy [No matching medication found in this treatment plan]          Squamous cell lung cancer (CMS/HCC)   7/24/2020 Initial Diagnosis    Squamous cell lung  cancer (CMS/HCC)     9/8/2020 -  Supportive Treatment    Treatment Summary   Treatment goal Supportive   Plan Name ZOLEDRONIC ACID (ZOMETA) EVERY 3 MONTHS: BONE METASTASES/MULTIPLE MYELOMA   Status Active   Start Date 9/8/2020 (Planned)   End Date 3/21/2023 (Planned)   Provider Ulices Morris MD   Chemotherapy [No matching medication found in this treatment plan]          Malignant neoplasm of hilus of lung, unspecified laterality (CMS/HCC)   7/27/2020 Initial Diagnosis    Malignant neoplasm of hilus of lung, unspecified laterality (CMS/HCC)     8/18/2020 - 10/7/2020 Chemotherapy    PACLitaxel (semi-synthetic) (TAXOL) 366 mg in sodium chloride 0.9 % 500 mL chemo infusion, 0 of 6 cycles  CARBOplatin (PARAPLATIN) in sodium chloride 0.9 % 250 mL chemo infusion, 0 of 6 cycles     9/8/2020 -  Supportive Treatment    Treatment Summary   Treatment goal Supportive   Plan Name ZOLEDRONIC ACID (ZOMETA) EVERY 3 MONTHS: BONE METASTASES/MULTIPLE MYELOMA   Status Active   Start Date 9/8/2020 (Planned)   End Date 3/21/2023 (Planned)   Provider Ulices Morris MD   Chemotherapy [No matching medication found in this treatment plan]          10/28/2020 -  Chemotherapy    DOCEtaxel (TAXOTERE) 146 mg in sodium chloride 0.9 % 250 mL chemo infusion, 3 of 6 cycles  Administration: 146 mg (10/28/2020), 116 mg (11/18/2020), 116 mg (12/11/2020)  RAMUCirumab (CYRAMZA) 700 mg in sodium chloride 0.9 % 250 mL chemo infusion, 3 of 6 cycles  Administration: 700 mg (10/28/2020), 700 mg (11/18/2020), 700 mg (12/11/2020)         ONCOLOGY SUMMARY  ## 07/2020   // squamous cell carcinoma of the lung   // Palliative RT to the left iliac, 07/30/2020 - 08/13/2020    ## 08/2020 - carboplatin paclitaxel pembrolizumab   --> C1D1 08/18/2020   --> C2D1 09/08/2020   --> C3D1 09/30/2020   >> disease progression with new soft tissue mass in the right rectus abdominis, increased size of cavitary RUL lung mass and right hilar mass and encasement of the right upper lobe  cronchus   >> NGS-FMO:     >>>> RAMIRO, TMB=13    >>>> CDKN2A L94P, DDR2 N456S, EPHA3 amp, HRAS Q61R, MLL2 P2842bn, PARK2 E96*, TP53 H168R, TP53 R273P    >>>> VUS: XOOA94N W506C, CARD11 A21T, CASP8 V31L, CD79A G179R, CTCF A250S, CUL4A R592T, UNP96S0 D487N, DDR2 amplification, FAM46C T149I, FH amplification, FLCN A64T, CWY8F33 R880H, MCL1 amplification, MDM4 T343fs*36, MEN1 E295A, MLH1 N64S, NFE2L2 E82A, NOTCH3 E0305Y, NTRK1 R6W and amplification, PAX5 amplification, QKI V175M, RET R3662E, SDHC amplification, SETD2 S6491A, LGR037 G440S    ## 10/2020 - docetaxel ramicirumab   --> C1D1 10/28/2020   --> C2D1 2020   >> MRI Heart: stable soft tissue mass 3 x 1.5 cm of the right ventricular apex   >> PET:  Partial response to treatment with decreased avidity associated with RUL mass, right hilar bernadette, right ventricular, and left iliac bone metastasis; decreased size of right parenchymal, right rectus and stable left rectus met   --> C3D1 2020      CHIEF COMPLAINT  Chief Complaint   Patient presents with   • Office Visit     lung   • Labs Only     cbc,cmp   • TREATMENT     ivf?       INTERVAL HISTORY  Patient presents today to follow-up for squamous cell carcinoma of the lung as above.    He is C3 D11 today.  For the past couple of days, complains of generalized myalgia, and said that his mouth \"feels like sand paper\".  Also had decreased oral intake.  Continues to have pain in the right side of the abdomen.    Using fentanyl patch 75 mcg.    REVIEW OF SYSTEMS  Ten-point review of systems documented by MA are reviewed and addressed.    CURRENT MEDICATIONS  Current Outpatient Medications   Medication Sig   • dexamethasone (DECADRON) 4 MG tablet Take 2 tablets by mouth 2 times daily. Take for 3 days with each chemotherapy cycle.  Start the day before each docetaxel treatment.   • fentaNYL (DURAGESIC) 75 MCG/HR patch Place 1 patch onto the skin every 72 hours.   • clotrimazole-betamethasone (LOTRISONE) 1-0.05 % cream  Apply topically 2 times daily.   • HYDROmorphone (DILAUDID) 2 MG tablet Take 1-2 tablets by mouth every 4 hours as needed for Pain.   • prochlorperazine (COMPAZINE) 10 MG tablet Take 1 tablet by mouth every 6 hours as needed for Nausea or Vomiting.   • omeprazole 20 MG tablet Take 1 tablet by mouth daily.   • acetaminophen (TYLENOL) 500 MG tablet Take 500 mg by mouth every 6 hours as needed for Pain.   • albuterol (VENTOLIN HFA) 108 (90 Base) MCG/ACT inhaler Inhale 2 puffs into the lungs every 4 hours as needed for Shortness of Breath or Wheezing.   • fentaNYL (DURAGESIC) 50 MCG/HR patch Place 1 patch onto the skin every 3 days.     Current Facility-Administered Medications   Medication   • sodium chloride (NORMAL SALINE) 0.9 % bolus 1,000 mL   • heparin 100 UNIT/ML lock flush 500 Units     Facility-Administered Medications Ordered in Other Visits   Medication   • heparin 100 UNIT/ML lock flush 500 Units   • diphenhydrAMINE (BENADRYL) capsule 25 mg       PAST MEDICAL HISTORY  Past Medical History:   Diagnosis Date   • Essential (primary) hypertension    • Malignant neoplasm (CMS/HCC)    • RAD (reactive airway disease)        SOCIAL HISTORY  Social History     Tobacco Use   • Smoking status: Current Every Day Smoker     Packs/day: 0.50     Types: Cigarettes   • Smokeless tobacco: Never Used   Substance Use Topics   • Alcohol use: Yes     Alcohol/week: 70.0 standard drinks     Types: 70 Cans of beer per week     Comment: no alcohol for 1 week (8/17/2020)   • Drug use: Not on file       FAMILY HISTORY  No family history on file.    OBJECTIVES   PHYSICAL EXAMINATION  Oncology Encounter Vitals [12/21/20 0945]   ONC OP Encounter Vitals Group      /78      Heart Rate 74      Resp       Temp 96.7 °F (35.9 °C)      Temp src Temporal      SpO2       Weight 175 lb 14.4 oz (79.8 kg)      Height 5' 11\" (1.803 m)      Pain Score 3-4      Pain Location       Pain Education?       BSA (Calculated - m2) - Luis Fernando & Luis Fernando 2       BMI (Calculated) 24.53       ECOG Performance Status   ECOG [12/21/20 0956]   ECOG Performance Status 1          Physical Exam   Constitutional: He is oriented to person, place, and time and well-developed, well-nourished, and in no distress. No distress.   HENT:   Head: Normocephalic and atraumatic.   Mouth/Throat: Mucous membranes are normal. No oral lesions. No oropharyngeal exudate.   Eyes: Pupils are equal, round, and reactive to light. Conjunctivae and EOM are normal.   Neck: Neck supple. No JVD present. No thyroid mass present.   Cardiovascular: Normal rate, regular rhythm, S1 normal and S2 normal. Exam reveals no gallop and no friction rub.   No murmur heard.  Pulmonary/Chest: Effort normal and breath sounds normal. No respiratory distress. He has no wheezes. He has no rales. He exhibits no tenderness.   Abdominal: Soft. Bowel sounds are normal. He exhibits no distension. There is no abdominal tenderness. There is no rebound and no guarding.       Umbilical hernia   Musculoskeletal: Normal range of motion.         General: No edema.   Lymphadenopathy:     He has no cervical adenopathy.     He has no axillary adenopathy.        Right: No supraclavicular adenopathy present.        Left: No supraclavicular adenopathy present.   Neurological: He is alert and oriented to person, place, and time. No cranial nerve deficit. Gait normal.   Skin: Skin is warm and dry. No rash noted. He is not diaphoretic.   Psychiatric: Affect normal.        LABORATORY DATA  Results for orders placed or performed in visit on 12/21/20   ONCOLOGY PATHWAY DECISION   Result Value    [facility] [employer] ONC PATHWAYS TAG [id number]     Lab Results   Component Value Date    WBC 10.9 12/21/2020    HGB 13.1 12/21/2020     12/21/2020    .8 (H) 12/21/2020    Lab Results   Component Value Date    SODIUM 143 12/21/2020    POTASSIUM 4.1 12/21/2020    CHLORIDE 106 12/21/2020    CO2 30 12/21/2020    BUN 10 12/21/2020    CREATININE 0.78 12/21/2020     GLUCOSE 85 12/21/2020      No results found for: LDH Lab Results   Component Value Date    AST 14 12/21/2020    GPT 16 12/21/2020    ALKPT 84 12/21/2020    BILIRUBIN 0.2 12/21/2020    TOTPROTEIN 6.2 (L) 12/21/2020    ALBUMIN 3.1 (L) 12/21/2020        Recent Labs   Lab 12/21/20  0931 12/11/20  0805 11/24/20  1408 11/18/20  0847 11/11/20  1213 11/04/20  1103   HGB 13.1 12.4* 10.9* 11.1* 11.5* 10.6*   HCT 40.9 37.2* 33.6* 34.4* 35.1* 31.9*   .8* 104.8* 106.0* 105.5* 104.5* 102.9*     Recent Labs   Lab 12/21/20  0931 12/11/20  0805 11/24/20  1408 11/18/20  0847 11/11/20  1213 11/04/20  1103   Absolute Neutrophil  --   --   --   --   --  0.4*   Absolute Neutrophils 8.3* 4.0 4.7 4.0 4.9  --            PATHOLOGY DATA  Core biopsy left iliac bone (07/20/2020):  [facility]    Abdominal mass (10/02/2020): metastatic [facility]    IMAGING STUDIES  Results for orders placed during the hospital encounter of 12/03/20   PET CT FDG SKULL BASE TO MID-THIGHS    Narrative EXAM: 18F-FDG PET-CT     DATE OF SERVICE: 12/03/20    HISTORY:  Restaging. Metastatic squamous cell carcinoma of the lung.  Started docetaxel ramcirumab on 10/28/20.    COMPARISON: PET/CT 07/23/20, CT chest abdomen pelvis 09/29/20    RADIOPHARMACEUTICAL:  13.4 mCi of W-[id number]-4-Bgsxt-H-Glucose (FDG) IV.    PROCEDURE: Immediately prior to radionuclide injection, blood glucose was  measured by fingerstick and was 86 mg/dL (Normal range =  mg/dL).     60 minutes after radionuclide administration, PET and helical CT images of  the body from the skull base to the mid thighs were obtained and  reconstructed in the axial, coronal, and sagittal views.  Fusion images and  a maximum intensity projection (MIP) image were also generated.    FINDINGS: Evaluation of the head and neck is degraded by motion.    Decreased size of cavitary 2.6 x 1.9 cm peripherally avid right upper lobe  mass with SUV max of 10.3. Previously, 3.2 x 4.1 cm with SUV max of 20.7.  Associated  postobstructive atelectasis posteriorly.    Decreased size of approximately 2.8 x 2.3 cm right hilar node with SUV max  11.7. Previously, 3.2 x 3.9 cm with SUV max of 20.7. Continued narrowing of  the right upper lobe bronchus.    The right upper lobe mass and right hilar adenopathy has anatomically  decreased since CT 09/29/20.    Multiple pulmonary nodules which have anatomically decreased in size since  CT 09/29/20. For example, residual  0.2 cm basilar left lower lobe nodule  (previously, 0.7 cm).    Decrease avidity associated with right ventricular mass with SUV max of  10.6 (previously, 14.2). No pericardial effusion.    Basilar right lower lobe FDG avidity without CT correlate, could be related  to injection.    Left iliac bone demonstrates avidity less than background bone marrow post  radiation. Resolution of soft tissue component. Hyperplastic bone marrow  otherwise.    Comparison is made to CT 09/29/20.   -Decreased size of FDG avid approximately 2.9 cm right rectus lesion with  SUV max of 13.8. Previously, 5.4 cm.  -Stable 2.1 cm inferior left rectus lesion with SUV max of 20.    Small focal area of FDG uptake in area of the left psoas muscle. No  probably present on prior CT. SUV max of 3.5. Axial fused image 167.    Mild reactive uptake associated fat-containing of local hernia.    Additional low-dose CT findings: Small sliding hiatal hernia. Stable  positioning right port. Trace left pleural effusion. Biapical scarring.  Enlarged prostate. Bladder trabeculations. Small hydroceles. Prominent fat  inguinal canals. Mild ascites. Atherosclerosis. Fat-containing umbilical  hernia.        Impression IMPRESSION:    1. Partial response to treatment. Decrease avidity associated with right  upper lobe mass, right hilar bernadette, right ventricular and left iliac bone  (post radiation) metastases. Decreased size of pulmonary parenchymal, right  rectus, and stable left rectus metastases since CT 09/29/20.  2. Trace  left pleural effusion.     Results for orders placed during the hospital encounter of 12/07/20   MRI Cardiac Imaging    Narrative EXAM:  MRI CARDIAC W WO CONTRAST      HISTORY:   Mass R cardiac ventricle    COMPARISON:  None immediately available.    TECHNIQUE: Multisequence multiplanar cardiac MRI was performed with and  without contrast with ECG gating. Delayed enhancement imaging is performed  with reverse TI dark blood technique as possible. 16 mL intravenous  Gadavist was administered.    FINDINGS:    There is a 3 x 1.5 cm soft tissue mass involving the RIGHT ventricular  apex, appears to invade the anterior free wall the RIGHT ventricle and  distal septum towards the apex. The mass demonstrates subtle delayed  enhancement. There are no additional masses identified. On the axial T1  haste sequence there does not appear to be clear invasion of the  pericardial layers.    The thoracic aorta is normal. The main pulmonary artery is normal. There is  some soft tissue prominence involving the RIGHT hilum, please refer to  prior dedicated CT scan chest as well as PET/CT scan.      Impression IMPRESSION:    3 x 1.5 cm soft tissue mass at the level of the RIGHT ventricular apex,  appears to invade the anterior free wall of the RIGHT ventricle and the  interventricular septum at the level of the apex. There is no clearly  defined pericardial invasion, allowing for limitations of this exam.       I personally reviewed the imaging with patient.   [Maximal Pain Intensity: 0/10] : 0/10 [Least Pain Intensity: 0/10] : 0/10 [80: Normal activity with effort; some signs or symptoms of disease.] : 80: Normal activity with effort; some signs or symptoms of disease.

## 2021-11-12 ENCOUNTER — APPOINTMENT (OUTPATIENT)
Dept: CARDIOLOGY | Facility: CLINIC | Age: 76
End: 2021-11-12
Payer: COMMERCIAL

## 2021-11-12 PROCEDURE — 93306 TTE W/DOPPLER COMPLETE: CPT

## 2021-11-12 NOTE — ADDENDUM
[FreeTextEntry1] : I saw and examined the patient with the resident/ACP.  In summary, Mr. SEAMUS POPE is a 76 year male with metastatic neuroendocrine cancer.  I reviewed the records, images, and repeated the key components of the physical exam.  We discussed the potential acute and chronic side effects of radiation to the spine. The patient gave informed consent to proceed.  We will proceed with simulation. MINDY\par

## 2021-11-12 NOTE — HISTORY OF PRESENT ILLNESS
[Home] : at home, [unfilled] , at the time of the visit. [Medical Office: ([facility])___] : at the medical office located in  [Verbal consent obtained from patient] : the patient, [unfilled] [FreeTextEntry1] : 76 M w/ CKD, well differentiated neuroendocrine tumor of unknown primary since 2012 that has been followed by surg onc. Pt initially had Q 6 mos MRI a/p, then annual scans. Previous scan in June 2018 showed stable mesenteric mass and then patient delayed his follow up scan till Nov 2019. MRI Abd/Pelvis showed new bilobar liver lesions suspicious for hepatic mets, two new lesions at T12 and L3 concerning for bone mets. Patient lives in [place] and saw Dr. Saenz, medical oncology, and later switched to Dr. 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, mesenteric and R inguinal LN, multiple anterior abdominal wall mets, \par 1/17/20: Liver bx: well diff NET, Ki 30-70% \par 2/14/20: Lanreotide monthly started \par 6/5/20: MRI A/P: Interval increase in size of bilobar hepatic lesions \par 7/25/20: MRI A/P: interval incr in bilobar liver mets, rest of disease essentially unchanged, ?incr in size of T12 and femoral head mets\par 7/27/20: SIRT \par 8/24/20: MRI Abd: stable disease, improvement in liver mets\par 9/9/20: SIRT #2\par 9/18/20: CT Chest : unchanged \par 10/11/20: MRI A/P: R hepatic lobe decrease in size, otherwise stable disease\par 1/15/21: CT Chest, MR A/P: stable disease\par 5/6/21: CT Chest, MR A/P: stable disease, mesenteric mass slightly increased in size \par 7/14/21: CT Chest: stable pulm nodules, new indeterminant subdiaphragmatic LN MRI A/P: slight incr in mesenteric mass, new peritoneal disease with worsening ascites, hepatic disease without change \par 8/21/22: Everolimus 5 mg daily \par 9/17/21: Paracentesis: cyto neg, SAAG >1\par 10/7/21: Noted to have elevated Cr, worsening cough, possible PNA, Everolimus held. Worsening LE edema, ascites \par 10/7/21: CT Chest: LLL PNA? L pleural nodules some have increased in size\par 10/26/21: MRI A/P: POD in bone, liver, mesenteric mass, pericardial effusion. \par \par MRI 11/4 THORACIC SPINE showed  extensive osseous metastatic disease predominantly involving the T6-T12 vertebral bodies. No evidence of extraosseous or epidural extension of disease. Thoracic cord is normal in caliber and signal characteristics without abnormal intramedullary or leptomeningeal enhancement. LUMBAR SPINE: Extensive osseous metastatic disease throughout the lumbar spine, most confluent within the L3 vertebral body. No evidence of extraosseous or epidural extension of disease. Conus is normal in morphology without abnormal intramedullary or leptomeningeal enhancement.\par \par Today he is here for telephonic visit. Overall he is doing well. He said that his back pain that started 2 weeks ago is much improved. Denies any other symptoms. He gets therapeutic paracentesis routinely and is on 50% dose reduced everolimus

## 2021-11-15 ENCOUNTER — APPOINTMENT (OUTPATIENT)
Dept: RADIATION ONCOLOGY | Facility: CLINIC | Age: 76
End: 2021-11-15
Payer: COMMERCIAL

## 2021-11-15 ENCOUNTER — NON-APPOINTMENT (OUTPATIENT)
Age: 76
End: 2021-11-15

## 2021-11-15 ENCOUNTER — APPOINTMENT (OUTPATIENT)
Dept: DISASTER EMERGENCY | Facility: CLINIC | Age: 76
End: 2021-11-15

## 2021-11-15 VITALS
OXYGEN SATURATION: 98 % | HEIGHT: 68 IN | TEMPERATURE: 98 F | DIASTOLIC BLOOD PRESSURE: 81 MMHG | RESPIRATION RATE: 16 BRPM | SYSTOLIC BLOOD PRESSURE: 152 MMHG | HEART RATE: 74 BPM | BODY MASS INDEX: 27.35 KG/M2 | WEIGHT: 180.45 LBS

## 2021-11-15 PROCEDURE — 77332 RADIATION TREATMENT AID(S): CPT | Mod: 26

## 2021-11-15 PROCEDURE — 99213 OFFICE O/P EST LOW 20 MIN: CPT | Mod: 25,GC

## 2021-11-15 PROCEDURE — 77290 THER RAD SIMULAJ FIELD CPLX: CPT | Mod: 26

## 2021-11-15 PROCEDURE — 77262 THER RADIOLOGY TX PLNG INTRM: CPT

## 2021-11-15 RX ORDER — TOBRAMYCIN AND DEXAMETHASONE 3; 1 MG/G; MG/G
0.3-0.1 OINTMENT OPHTHALMIC
Qty: 4 | Refills: 0 | Status: DISCONTINUED | COMMUNITY
Start: 2021-09-24

## 2021-11-16 LAB — SARS-COV-2 N GENE NPH QL NAA+PROBE: NOT DETECTED

## 2021-11-16 NOTE — PHYSICAL EXAM
[Sclera] : the sclera and conjunctiva were normal [Outer Ear] : the ears and nose were normal in appearance [] : no respiratory distress [Heart Rate And Rhythm] : heart rate and rhythm were normal [Abdomen Soft] : soft [Nondistended] : nondistended [Abdomen Tenderness] : non-tender [Normal] : no palpable adenopathy [Supraclavicular Lymph Nodes Enlarged Bilaterally] : supraclavicular [Musculoskeletal - Swelling] : no joint swelling [Motor Tone] : muscle strength and tone were normal [No Spine Tenderness] : no tenderness to palpation of the vertebral spine [Skin Color & Pigmentation] : normal skin color and pigmentation [No Focal Deficits] : no focal deficits [Oriented To Time, Place, And Person] : oriented to person, place, and time

## 2021-11-17 ENCOUNTER — RESULT REVIEW (OUTPATIENT)
Age: 76
End: 2021-11-17

## 2021-11-17 NOTE — ADDENDUM
[FreeTextEntry1] : I saw and examined the patient with the resident/ACP.  In summary, Mr. SEAMUS POPE is a 76 year male with metastatic NET s/p multiple therapies as outlined above with a large met at L2.  I reviewed the records, images, and repeated the key components of the physical exam.  We discussed the potential acute and chronic side effects of RT to the lumbar spine.  The patient gave informed consent to proceed.  We will proceed with simulation. CARMELO\par

## 2021-11-17 NOTE — HISTORY OF PRESENT ILLNESS
[FreeTextEntry1] : Mr. 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 [place] [address] w/ well differentiated grade 3 disease based on Ki67 30-70% started on Lanreotide in Jan 2020. Subsequent POD in liver s/p SIRT x 2 (July and Sept 2020) and with POD noted in July 2021, he was started Everolimus 5 mg on 8/21/21. Patient recently reported back pain and MRI showed diffuse osseous mets in the T/L spine without epidural extension, but with nerve root impingement worst at L3-L4 spinal level.\par \par Brief Oncological History:\par 1/15/20 PET/DOTATATE showed  multiple hypermetabolic hepatic and osseous met, somatostatin pancreatic tail NET and probably small bowel NET, mesenteric and R inguinal LN, multiple anterior abdominal wall mets.\par 1/17/20: Liver bx: well diff NET, Ki 30-70% \par 2/14/20: Lanreotide monthly started \par 6/5/20: MRI A/P: Interval increase in size of bilobar hepatic lesions \par 7/25/20: MRI A/P: interval incr in bilobar liver mets, rest of disease essentially unchanged, ?incr in size of T12 and femoral head mets\par 7/27/20: SIRT \par 8/24/20: MRI Abd: stable disease, improvement in liver mets\par 9/9/20: SIRT #2\par 9/18/20: CT Chest : unchanged \par 10/11/20: MRI A/P: R hepatic lobe decrease in size, otherwise stable disease\par 1/15/21: CT Chest, MR A/P: stable disease\par 5/6/21: CT Chest, MR A/P: stable disease, mesenteric mass slightly increased in size \par 7/14/21: CT Chest: stable pulm nodules, new indeterminant subdiaphragmatic LN MRI A/P: slight incr in mesenteric mass, new peritoneal disease with worsening ascites, hepatic disease without change \par 8/21/22: Everolimus 5 mg daily \par 9/17/21: Paracentesis: cyto neg, SAAG >1\par 10/7/21: Noted to have elevated Cr, worsening cough, possible PNA, Everolimus held. Worsening LE edema, ascites \par 10/7/21: CT Chest: LLL PNA? L pleural nodules some have increased in size\par 10/26/21: MRI A/P: POD in bone, liver, mesenteric mass, pericardial effusion. \par \par MRI 11/4 THORACIC SPINE showed  extensive osseous metastatic disease predominantly involving the T6-T12 vertebral bodies. No evidence of extraosseous or epidural extension of disease. Thoracic cord is normal in caliber and signal characteristics without abnormal intramedullary or leptomeningeal enhancement. LUMBAR SPINE: Extensive osseous metastatic disease throughout the lumbar spine, most confluent within the L3 vertebral body. No evidence of extraosseous or epidural extension of disease. Conus is normal in morphology without abnormal intramedullary or leptomeningeal enhancement.\par \par Overall he is doing well. He said that his back pain that started 2 weeks ago is much improved. Denies any other symptoms. He gets therapeutic paracentesis routinely and is on 50% dose reduced everolimus.\par \par After last week consult over telephone, patient presents in person for CT Simulation and examination. No complaints of pain at this time. Notes he took a few doses of Oxycodone with pain relief and since back pain stable.

## 2021-11-18 ENCOUNTER — RESULT REVIEW (OUTPATIENT)
Age: 76
End: 2021-11-18

## 2021-11-18 ENCOUNTER — OUTPATIENT (OUTPATIENT)
Dept: OUTPATIENT SERVICES | Facility: HOSPITAL | Age: 76
LOS: 1 days | End: 2021-11-18
Payer: COMMERCIAL

## 2021-11-18 ENCOUNTER — APPOINTMENT (OUTPATIENT)
Dept: ULTRASOUND IMAGING | Facility: IMAGING CENTER | Age: 76
End: 2021-11-18
Payer: COMMERCIAL

## 2021-11-18 DIAGNOSIS — R18.8 OTHER ASCITES: ICD-10-CM

## 2021-11-18 DIAGNOSIS — Z90.49 ACQUIRED ABSENCE OF OTHER SPECIFIED PARTS OF DIGESTIVE TRACT: Chronic | ICD-10-CM

## 2021-11-18 DIAGNOSIS — Z90.79 ACQUIRED ABSENCE OF OTHER GENITAL ORGAN(S): Chronic | ICD-10-CM

## 2021-11-18 PROCEDURE — 88305 TISSUE EXAM BY PATHOLOGIST: CPT

## 2021-11-18 PROCEDURE — 88112 CYTOPATH CELL ENHANCE TECH: CPT

## 2021-11-18 PROCEDURE — 88112 CYTOPATH CELL ENHANCE TECH: CPT | Mod: 26

## 2021-11-18 PROCEDURE — 49083 ABD PARACENTESIS W/IMAGING: CPT

## 2021-11-18 PROCEDURE — 88305 TISSUE EXAM BY PATHOLOGIST: CPT | Mod: 26

## 2021-11-18 PROCEDURE — 77306 TELETHX ISODOSE PLAN SIMPLE: CPT | Mod: 26

## 2021-11-22 LAB — NON-GYNECOLOGICAL CYTOLOGY STUDY: SIGNIFICANT CHANGE UP

## 2021-12-02 ENCOUNTER — RESULT REVIEW (OUTPATIENT)
Age: 76
End: 2021-12-02

## 2021-12-02 ENCOUNTER — LABORATORY RESULT (OUTPATIENT)
Age: 76
End: 2021-12-02

## 2021-12-02 ENCOUNTER — APPOINTMENT (OUTPATIENT)
Dept: DISASTER EMERGENCY | Facility: CLINIC | Age: 76
End: 2021-12-02

## 2021-12-02 ENCOUNTER — OUTPATIENT (OUTPATIENT)
Dept: OUTPATIENT SERVICES | Facility: HOSPITAL | Age: 76
LOS: 1 days | Discharge: HOME | End: 2021-12-02

## 2021-12-02 ENCOUNTER — APPOINTMENT (OUTPATIENT)
Dept: HEMATOLOGY ONCOLOGY | Facility: CLINIC | Age: 76
End: 2021-12-02

## 2021-12-02 DIAGNOSIS — C7A.8 OTHER MALIGNANT NEUROENDOCRINE TUMORS: ICD-10-CM

## 2021-12-02 DIAGNOSIS — Z90.49 ACQUIRED ABSENCE OF OTHER SPECIFIED PARTS OF DIGESTIVE TRACT: Chronic | ICD-10-CM

## 2021-12-02 DIAGNOSIS — Z90.79 ACQUIRED ABSENCE OF OTHER GENITAL ORGAN(S): Chronic | ICD-10-CM

## 2021-12-02 LAB
APTT BLD: 27.9 SEC
BASOPHILS # BLD AUTO: 0.03 K/UL — SIGNIFICANT CHANGE UP (ref 0–0.2)
BASOPHILS NFR BLD AUTO: 0.5 % — SIGNIFICANT CHANGE UP (ref 0–2)
EOSINOPHIL # BLD AUTO: 0.03 K/UL — SIGNIFICANT CHANGE UP (ref 0–0.5)
EOSINOPHIL NFR BLD AUTO: 0.5 % — SIGNIFICANT CHANGE UP (ref 0–6)
HCT VFR BLD CALC: 25.5 % — LOW (ref 39–50)
HGB BLD-MCNC: 8.1 G/DL — LOW (ref 13–17)
IMM GRANULOCYTES NFR BLD AUTO: 0.5 % — SIGNIFICANT CHANGE UP (ref 0–1.5)
INR PPP: 0.94 RATIO
LYMPHOCYTES # BLD AUTO: 0.46 K/UL — LOW (ref 1–3.3)
LYMPHOCYTES # BLD AUTO: 7.7 % — LOW (ref 13–44)
MCHC RBC-ENTMCNC: 27.9 PG — SIGNIFICANT CHANGE UP (ref 27–34)
MCHC RBC-ENTMCNC: 31.8 G/DL — LOW (ref 32–36)
MCV RBC AUTO: 87.9 FL — SIGNIFICANT CHANGE UP (ref 80–100)
MONOCYTES # BLD AUTO: 0.46 K/UL — SIGNIFICANT CHANGE UP (ref 0–0.9)
MONOCYTES NFR BLD AUTO: 7.7 % — SIGNIFICANT CHANGE UP (ref 2–14)
NEUTROPHILS # BLD AUTO: 4.94 K/UL — SIGNIFICANT CHANGE UP (ref 1.8–7.4)
NEUTROPHILS NFR BLD AUTO: 83.1 % — HIGH (ref 43–77)
NRBC # BLD: 0 /100 WBCS — SIGNIFICANT CHANGE UP (ref 0–0)
PLATELET # BLD AUTO: 186 K/UL — SIGNIFICANT CHANGE UP (ref 150–400)
PT BLD: 11.2 SEC
RBC # BLD: 2.9 M/UL — LOW (ref 4.2–5.8)
RBC # FLD: 18.6 % — HIGH (ref 10.3–14.5)
WBC # BLD: 5.95 K/UL — SIGNIFICANT CHANGE UP (ref 3.8–10.5)
WBC # FLD AUTO: 5.95 K/UL — SIGNIFICANT CHANGE UP (ref 3.8–10.5)

## 2021-12-02 PROCEDURE — 77431 RADIATION THERAPY MANAGEMENT: CPT

## 2021-12-02 PROCEDURE — 77280 THER RAD SIMULAJ FIELD SMPL: CPT | Mod: 26

## 2021-12-03 LAB
ALBUMIN SERPL ELPH-MCNC: 3.4 G/DL
ALP BLD-CCNC: 409 U/L
ALT SERPL-CCNC: 19 U/L
ANION GAP SERPL CALC-SCNC: 16 MMOL/L
AST SERPL-CCNC: 25 U/L
BILIRUB SERPL-MCNC: 0.4 MG/DL
BUN SERPL-MCNC: 50 MG/DL
CALCIUM SERPL-MCNC: 8.6 MG/DL
CHLORIDE SERPL-SCNC: 105 MMOL/L
CO2 SERPL-SCNC: 19 MMOL/L
CREAT SERPL-MCNC: 3.43 MG/DL
GLUCOSE SERPL-MCNC: 150 MG/DL
POTASSIUM SERPL-SCNC: 3.3 MMOL/L
PROT SERPL-MCNC: 6.2 G/DL
SODIUM SERPL-SCNC: 140 MMOL/L

## 2021-12-08 ENCOUNTER — RESULT REVIEW (OUTPATIENT)
Age: 76
End: 2021-12-08

## 2021-12-08 ENCOUNTER — APPOINTMENT (OUTPATIENT)
Dept: ULTRASOUND IMAGING | Facility: IMAGING CENTER | Age: 76
End: 2021-12-08
Payer: COMMERCIAL

## 2021-12-08 ENCOUNTER — OUTPATIENT (OUTPATIENT)
Dept: OUTPATIENT SERVICES | Facility: HOSPITAL | Age: 76
LOS: 1 days | End: 2021-12-08
Payer: COMMERCIAL

## 2021-12-08 ENCOUNTER — APPOINTMENT (OUTPATIENT)
Dept: HEMATOLOGY ONCOLOGY | Facility: CLINIC | Age: 76
End: 2021-12-08
Payer: COMMERCIAL

## 2021-12-08 VITALS
TEMPERATURE: 97.7 F | HEART RATE: 81 BPM | RESPIRATION RATE: 17 BRPM | DIASTOLIC BLOOD PRESSURE: 75 MMHG | OXYGEN SATURATION: 97 % | BODY MASS INDEX: 27.16 KG/M2 | HEIGHT: 67.99 IN | SYSTOLIC BLOOD PRESSURE: 123 MMHG | WEIGHT: 179.21 LBS

## 2021-12-08 DIAGNOSIS — M54.50 LOW BACK PAIN, UNSPECIFIED: ICD-10-CM

## 2021-12-08 DIAGNOSIS — R18.8 OTHER ASCITES: ICD-10-CM

## 2021-12-08 DIAGNOSIS — D3A.8 OTHER BENIGN NEUROENDOCRINE TUMORS: ICD-10-CM

## 2021-12-08 DIAGNOSIS — Z90.79 ACQUIRED ABSENCE OF OTHER GENITAL ORGAN(S): Chronic | ICD-10-CM

## 2021-12-08 DIAGNOSIS — Z00.8 ENCOUNTER FOR OTHER GENERAL EXAMINATION: ICD-10-CM

## 2021-12-08 DIAGNOSIS — Z90.49 ACQUIRED ABSENCE OF OTHER SPECIFIED PARTS OF DIGESTIVE TRACT: Chronic | ICD-10-CM

## 2021-12-08 PROCEDURE — 49083 ABD PARACENTESIS W/IMAGING: CPT

## 2021-12-08 PROCEDURE — 99214 OFFICE O/P EST MOD 30 MIN: CPT

## 2021-12-08 NOTE — REVIEW OF SYSTEMS
[Fatigue] : fatigue [Recent Change In Weight] : ~T recent weight change [Lower Ext Edema] : lower extremity edema [Joint Pain] : joint pain [Joint Stiffness] : joint stiffness [Difficulty Walking] : difficulty walking [Negative] : Allergic/Immunologic

## 2021-12-24 PROBLEM — M54.50 LOWER BACK PAIN: Status: ACTIVE | Noted: 2021-11-01

## 2021-12-24 PROBLEM — R18.8 ASCITES: Status: ACTIVE | Noted: 2021-09-08

## 2021-12-24 PROBLEM — D3A.8 NEUROENDOCRINE NEOPLASM OF GASTROINTESTINAL TRACT: Status: ACTIVE | Noted: 2019-12-30

## 2021-12-24 NOTE — PHYSICAL EXAM
[Restricted in physically strenuous activity but ambulatory and able to carry out work of a light or sedentary nature] : Status 1- Restricted in physically strenuous activity but ambulatory and able to carry out work of a light or sedentary nature, e.g., light house work, office work [Normal] : affect appropriate [de-identified] : + LE edema b/l  [de-identified] : + distended abdomen, fluid wave

## 2021-12-24 NOTE — HISTORY OF PRESENT ILLNESS
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 Pt initially had Q 6 mos MRI a/p, then annual scans. Previous scan in June 2018 showed stable mesenteric mass and then patient delayed his follow up scan till Nov 2019. MRI Abd/Pelvis showed new bilobar liver lesions suspicious for hepatic mets, two new lesions at T12 and L3 concerning for bone mets. Patient lives in [place] and saw Dr. Saenz, medical oncology, who recommended a liver biopsy but wanted a second opinion. 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, mesenteric and R inguinal LN, multiple anterior abdominal wall mets, \par 1/17/20: Liver bx: well diff NET, Ki 30-70% (upon re-review)\par 2/14/20: Lanreotide monthly started \par 5/15/20: CT Chest: clear lungs\par 6/5/20: MRI A/P: Interval increase in size of bilobar hepatic lesions \par 7/25/20: MRI A/P: interval incr in bilobar liver mets, rest of disease essentially unchanged, ?incr in size of T12 and femoral head mets\par 7/27/20: SIRT \par 8/24/20: MRI Abd: stable disease, improvement in liver mets\par 9/9/20: SIRT #2\par 9/18/20: CT Chest : unchanged \par 10/11/20: MRI A/P: R hepatic lobe decrease in size, otherwise stable disease\par 1/15/21: CT Chest, MR A/P: stable disease\par 5/6/21: CT Chest, MR A/P: stable disease, mesenteric mass slightly increased in size \par 7/14/21: CT Chest: stable pulm nodules, new indeterminant subdiaphragmatic LN MRI A/P: slight incr in mesenteric mass, new peritoneal disease with worsening ascites, hepatic disease without change \par 8/21/22: Everolimus 5 mg daily \par 9/17/21: Paracentesis: cyto neg, SAAG >1\par 10/7/21: Noted to have elevated Cr, worsening cough, possible PNA, Everolimus held. Worsening LE edema, ascites \par 10/7/21: CT Chest: LLL PNA? L pleural nodules some have increased in size\par 10/26/21: MRI A/P: POD in bone, liver, mesenteric mass, pericardial effusion\par 11/1/21: MRI T/L spine: extensive osseous mets met disease, most confluent within L3 spine\par 12/2/21: RT to L spine \par 12/1/21: palliative RT to the spine  [de-identified] : well differentiated NET, Ki67 30-70% [de-identified] : 2012 Mesenteric mass bx: well differentiated NET, Ki <3%, mitotic rate low [FreeTextEntry1] : s/p RT to L spine  [de-identified] : + fatigue, difficulty climbing stairs, worsening abdominal distention. Paracentesis scheduled today. Received RT to the spine. Poor appetite. Is having frequent BMs after eating. \par Went for 2nd opinion at [facility], recommended FOLFOX. Pt will be transferring care to [facility].

## 2022-01-31 ENCOUNTER — APPOINTMENT (OUTPATIENT)
Dept: HEPATOLOGY | Facility: CLINIC | Age: 77
End: 2022-01-31

## 2023-07-26 NOTE — END OF VISIT
[Time Spent: ___ minutes] : I have spent [unfilled] minutes of time on the encounter. [>50% of the face to face encounter time was spent on counseling and/or coordination of care for ___] : Greater than 50% of the face to face encounter time was spent on counseling and/or coordination of care for [unfilled] Low Dose Naltrexone Pregnancy And Lactation Text: Naltrexone is pregnancy category C.  There have been no adequate and well-controlled studies in pregnant women.  It should be used in pregnancy only if the potential benefit justifies the potential risk to the fetus.   Limited data indicates that naltrexone is minimally excreted into breastmilk.

## 2024-01-12 NOTE — DATA REVIEWED
How Severe Is Your Condition?: mild
[FreeTextEntry1] : MRI  images reviewed and results discussed at length with the patient.\par